# Patient Record
Sex: FEMALE | Race: BLACK OR AFRICAN AMERICAN | NOT HISPANIC OR LATINO | Employment: OTHER | URBAN - METROPOLITAN AREA
[De-identification: names, ages, dates, MRNs, and addresses within clinical notes are randomized per-mention and may not be internally consistent; named-entity substitution may affect disease eponyms.]

---

## 2017-01-10 ENCOUNTER — OFFICE VISIT (OUTPATIENT)
Dept: OBSTETRICS AND GYNECOLOGY | Facility: CLINIC | Age: 67
End: 2017-01-10
Payer: MEDICARE

## 2017-01-10 ENCOUNTER — HOSPITAL ENCOUNTER (OUTPATIENT)
Dept: RADIOLOGY | Facility: HOSPITAL | Age: 67
Discharge: HOME | End: 2017-01-10
Attending: ADVANCED PRACTICE MIDWIFE
Payer: MEDICARE

## 2017-01-10 ENCOUNTER — OFFICE VISIT (OUTPATIENT)
Dept: INTERNAL MEDICINE | Facility: CLINIC | Age: 67
End: 2017-01-10
Payer: MEDICARE

## 2017-01-10 VITALS
DIASTOLIC BLOOD PRESSURE: 70 MMHG | BODY MASS INDEX: 23.29 KG/M2 | WEIGHT: 136.44 LBS | SYSTOLIC BLOOD PRESSURE: 110 MMHG | HEIGHT: 64 IN

## 2017-01-10 VITALS
BODY MASS INDEX: 23.05 KG/M2 | SYSTOLIC BLOOD PRESSURE: 128 MMHG | DIASTOLIC BLOOD PRESSURE: 70 MMHG | TEMPERATURE: 97 F | WEIGHT: 134.25 LBS

## 2017-01-10 DIAGNOSIS — E78.5 HYPERLIPIDEMIA, UNSPECIFIED HYPERLIPIDEMIA TYPE: ICD-10-CM

## 2017-01-10 DIAGNOSIS — C90.01 MULTIPLE MYELOMA IN REMISSION: ICD-10-CM

## 2017-01-10 DIAGNOSIS — Z12.31 ENCOUNTER FOR SCREENING MAMMOGRAM FOR BREAST CANCER: ICD-10-CM

## 2017-01-10 DIAGNOSIS — E55.9 VITAMIN D INSUFFICIENCY: ICD-10-CM

## 2017-01-10 DIAGNOSIS — R53.83 FATIGUE, UNSPECIFIED TYPE: ICD-10-CM

## 2017-01-10 DIAGNOSIS — D63.8 ANEMIA OF CHRONIC DISORDER: Primary | ICD-10-CM

## 2017-01-10 DIAGNOSIS — Z01.419 ENCOUNTER FOR GYNECOLOGICAL EXAMINATION WITHOUT ABNORMAL FINDING: Primary | ICD-10-CM

## 2017-01-10 PROCEDURE — G0101 CA SCREEN;PELVIC/BREAST EXAM: HCPCS | Mod: S$PBB,,, | Performed by: NURSE PRACTITIONER

## 2017-01-10 PROCEDURE — 99999 PR PBB SHADOW E&M-EST. PATIENT-LVL III: CPT | Mod: PBBFAC,,, | Performed by: FAMILY MEDICINE

## 2017-01-10 PROCEDURE — 99999 PR PBB SHADOW E&M-EST. PATIENT-LVL III: CPT | Mod: PBBFAC,,, | Performed by: NURSE PRACTITIONER

## 2017-01-10 PROCEDURE — 77067 SCR MAMMO BI INCL CAD: CPT | Mod: 26,,, | Performed by: RADIOLOGY

## 2017-01-10 PROCEDURE — 99203 OFFICE O/P NEW LOW 30 MIN: CPT | Mod: S$PBB,,, | Performed by: FAMILY MEDICINE

## 2017-01-10 PROCEDURE — 88175 CYTOPATH C/V AUTO FLUID REDO: CPT

## 2017-01-10 PROCEDURE — 77067 SCR MAMMO BI INCL CAD: CPT | Mod: TC

## 2017-01-10 RX ORDER — ERGOCALCIFEROL 1.25 MG/1
50000 CAPSULE ORAL
COMMUNITY
End: 2018-03-22

## 2017-01-10 RX ORDER — METHADONE HYDROCHLORIDE 5 MG/1
2.5 TABLET ORAL
COMMUNITY
Start: 2016-11-22 | End: 2018-06-14

## 2017-01-10 RX ORDER — PANTOPRAZOLE SODIUM 40 MG/1
TABLET, DELAYED RELEASE ORAL
Refills: 2 | COMMUNITY
Start: 2016-12-21

## 2017-01-10 RX ORDER — LENALIDOMIDE 10 MG/1
1 CAPSULE ORAL DAILY
Refills: 0 | COMMUNITY
Start: 2016-12-30 | End: 2019-01-28

## 2017-01-10 RX ORDER — DEXAMETHASONE 4 MG/1
40 TABLET ORAL
COMMUNITY

## 2017-01-10 RX ORDER — ASPIRIN 81 MG/1
81 TABLET ORAL
COMMUNITY
Start: 2016-08-27 | End: 2018-07-23 | Stop reason: SDUPTHER

## 2017-01-10 RX ORDER — VALACYCLOVIR HCL 500 MG
500 TABLET ORAL
COMMUNITY
Start: 2016-06-17

## 2017-01-10 NOTE — PROGRESS NOTES
"CC: Well woman exam    Rhonda Jaramillo is a 67 y.o. female  presents for well woman exam.  LMP: No LMP recorded. Patient is postmenopausal..  No issues, problems, or complaints.      Past Medical History   Diagnosis Date    Bone cancer     Hyperlipidemia      Past Surgical History   Procedure Laterality Date    Arm surgery      Appendectomy       Social History     Social History    Marital status: Single     Spouse name: N/A    Number of children: N/A    Years of education: N/A     Occupational History    Not on file.     Social History Main Topics    Smoking status: Never Smoker    Smokeless tobacco: Not on file    Alcohol use No    Drug use: Not on file    Sexual activity: No     Other Topics Concern    Not on file     Social History Narrative     History reviewed. No pertinent family history.  OB History      Para Term  AB TAB SAB Ectopic Multiple Living    4    1  1   3          Visit Vitals    /70    Ht 5' 4" (1.626 m)    Wt 61.9 kg (136 lb 7.4 oz)    BMI 23.42 kg/m2         ROS:  GENERAL: Denies weight gain or weight loss. Feeling well overall.   SKIN: Denies rash or lesions.   HEAD: Denies head injury or headache.   NODES: Denies enlarged lymph nodes.   CHEST: Denies chest pain or shortness of breath.   CARDIOVASCULAR: Denies palpitations or left sided chest pain.   ABDOMEN: No abdominal pain, constipation, diarrhea, nausea, vomiting or rectal bleeding.   URINARY: No frequency, dysuria, hematuria, or burning on urination.  REPRODUCTIVE: See HPI.   BREASTS: The patient performs breast self-examination and denies pain, lumps, or nipple discharge.   HEMATOLOGIC: No easy bruisability or excessive bleeding.   MUSCULOSKELETAL: Denies joint pain or swelling.   NEUROLOGIC: Denies syncope or weakness.   PSYCHIATRIC: Denies depression, anxiety or mood swings.    PHYSICAL EXAM:  APPEARANCE: Well nourished, well developed, in no acute distress.  AFFECT: WNL, alert and " oriented x 3  SKIN: No acne or hirsutism  NECK: Neck symmetric without masses or thyromegaly  NODES: No inguinal, cervical, axillary, or femoral lymph node enlargement  CHEST: Good respiratory effect  ABDOMEN: Soft.  No tenderness or masses.  No hepatosplenomegaly.  No hernias.  BREASTS: Symmetrical, no skin changes or visible lesions.  No palpable masses, nipple discharge bilaterally.  PELVIC: Normal external genitalia without lesions.  Normal hair distribution.  Adequate perineal body, normal urethral meatus.  Vagina atrophic  without lesions or discharge.  Cervix atrophic pink, without lesions, discharge or tenderness.  No significant cystocele or rectocele.  Bimanual exam shows uterus to be normal size, regular, mobile and nontender.  Adnexa without masses or tenderness.    EXTREMITIES: No edema.  Physical Exam    1. Encounter for gynecological examination without abnormal finding  Liquid-based pap smear, screening   2. Encounter for screening mammogram for breast cancer  Mammo Digital Screening Bilat with CAD    AND PLAN:    Patient was counseled today on A.C.S. Pap guidelines and recommendations for yearly pelvic exams, mammograms and monthly self breast exams; to see her PCP for other health maintenance.

## 2017-01-10 NOTE — PROGRESS NOTES
Subjective:   Patient ID:  Rhonda Jaramillo is a 67 y.o. female.  Chief Complaint:  Annual Exam    Past Medical History   Diagnosis Date    Bone cancer     Hyperlipidemia      Past Surgical History   Procedure Laterality Date    Arm surgery      Appendectomy       History reviewed. No pertinent family history.  Review of patient's allergies indicates:   Allergen Reactions    Vicodin [hydrocodone-acetaminophen] Swelling    Celebrex [celecoxib] Swelling    Neurontin [gabapentin] Swelling    Nabumetone Other (See Comments)     Patient do not remember the adverse reaction to medication    Ibuprofen Rash     Current Outpatient Prescriptions   Medication Sig Dispense Refill    aspirin (ECOTRIN) 81 MG EC tablet Take 81 mg by mouth.      dexamethasone (DECADRON) 4 MG Tab Take 40 mg by mouth.      ergocalciferol (VITAMIN D2) 50,000 unit Cap Take 50,000 Units by mouth every 7 days.      methadone (DOLOPHINE) 5 MG tablet Take 2.5 mg by mouth.      pantoprazole (PROTONIX) 40 MG tablet TK 1 T PO D WITH BREAKFAST  2    REVLIMID 10 mg Cap Take 1 capsule by mouth once daily.   0    valacyclovir (VALTREX) 500 MG tablet Take 500 mg by mouth.       No current facility-administered medications for this visit.      HPI Comments: Presents to establish care with a PCP.  From [place], treated in [place] for multiple myeloma.  Recently relocated back to [place] and cared for by Dr. Ayala.  History of multiple myeloma with bone marrow transplant and chronic anemia.  Resultant chronic low back and bone pain.  Medications provided by Heme/Onc  History hyperlipidemia and vitamin D insufficiency.  Last labs stable in 2015.  Recent CBC and CMP stable.  H&H 11 and 33.  No diabetes.  Creatinine normal.  No recent colonoscopy.  Reports fecal occult blood tested and negative in [place].  Declines flu vaccine.  Saw GYN today.  Complains of intermittent and recurring left ear pain      Review of Systems   Constitutional:  Negative for chills, fatigue and fever.   HENT: Positive for ear pain. Negative for congestion, dental problem, hearing loss, postnasal drip, sinus pressure, sore throat, tinnitus, trouble swallowing and voice change.    Eyes: Negative for visual disturbance.   Respiratory: Negative for cough, chest tightness, shortness of breath and wheezing.    Cardiovascular: Negative for chest pain, palpitations and leg swelling.   Gastrointestinal: Negative for abdominal pain, blood in stool, constipation, diarrhea, nausea and vomiting.   Endocrine: Negative for polydipsia, polyphagia and polyuria.   Genitourinary: Negative for difficulty urinating, dysuria, flank pain, hematuria and pelvic pain.   Musculoskeletal: Positive for back pain and myalgias.   Skin: Negative for rash.   Neurological: Negative for dizziness, syncope, weakness, light-headedness and headaches.   Hematological: Negative for adenopathy. Bruises/bleeds easily.   Psychiatric/Behavioral: Negative.        Objective:     Visit Vitals    /70 (BP Location: Right arm, Patient Position: Sitting, BP Method: Manual)    Temp 96.9 °F (36.1 °C) (Tympanic)    Wt 60.9 kg (134 lb 4.2 oz)    BMI 23.05 kg/m2     Physical Exam   Constitutional: She is oriented to person, place, and time. Vital signs are normal. She appears cachectic.  Non-toxic appearance. She does not have a sickly appearance. She does not appear ill. No distress.   HENT:   Right Ear: Hearing, tympanic membrane, external ear and ear canal normal.   Left Ear: Hearing, tympanic membrane, external ear and ear canal normal.   Nose: Nose normal. Right sinus exhibits no maxillary sinus tenderness and no frontal sinus tenderness. Left sinus exhibits no maxillary sinus tenderness and no frontal sinus tenderness.   Mouth/Throat: Uvula is midline, oropharynx is clear and moist and mucous membranes are normal.   Eyes: Conjunctivae, EOM and lids are normal. Pupils are equal, round, and reactive to light.    Neck: No JVD present. No thyroid mass and no thyromegaly present.   Cardiovascular: Normal rate, regular rhythm and normal heart sounds.  Exam reveals no gallop and no friction rub.    No murmur heard.  Pulses:       Radial pulses are 2+ on the right side, and 2+ on the left side.   Pulmonary/Chest: Effort normal and breath sounds normal. She has no wheezes. She has no rhonchi. She has no rales.   Abdominal: Soft. Bowel sounds are normal. She exhibits no distension. There is no tenderness. There is no rebound, no guarding and no CVA tenderness.   Musculoskeletal: She exhibits no edema.   Neurological: She is oriented to person, place, and time. She displays a negative Romberg sign. Coordination and gait normal.   Skin: Skin is warm, dry and intact. No rash noted.   Psychiatric: She has a normal mood and affect. Her speech is normal and behavior is normal. Judgment and thought content normal. Cognition and memory are normal.   Nursing note and vitals reviewed.    Assessment:     1. Anemia of chronic disorder    2. Multiple myeloma in remission    3. Hyperlipidemia, unspecified hyperlipidemia type    4. Vitamin D insufficiency    5. Fatigue, unspecified type      Plan:   Anemia of chronic disorder/Multiple myeloma in remission  Continue medications, management, and follow-up with Dr. Ayala    Hyperlipidemia, unspecified hyperlipidemia type  -     Lipid panel; Future; Expected date: 1/10/17  Previous levels normal off medications.  [place] as indicated.    Vitamin D insufficiency  -     Vitamin D; Future; Expected date: 1/10/17  Adjust supplementation as needed.    Fatigue, unspecified type  -     TSH; Future; Expected date: 1/10/17  Treat as indicated.    Return to clinic 6 months or sooner as needed.

## 2017-01-10 NOTE — MR AVS SNAPSHOT
[facility]  [address] [place] [address]  Phone: [phone]  Fax: [phone]                  Rhonda Jaramillo   1/10/2017 11:00 AM   Office Visit    Description:  Female : 1950   Provider:  Raymond Mireles MD   Department:  [facility]           Reason for Visit     Annual Exam           Diagnoses this Visit        Comments    Anemia of chronic disorder    -  Primary     Multiple myeloma in remission         Hyperlipidemia, unspecified hyperlipidemia type         Vitamin D insufficiency         Fatigue, unspecified type                To Do List           Future Appointments        Provider Department Dept Phone    1/10/2017 12:45 PM [facility] [id number] [facility] [phone]    1/10/2017 2:00 PM LAB, SAME DAY [facility] [phone]      Goals (5 Years of Data)     None      Follow-Up and Disposition     Return in about 6 months (around 7/10/2017).      [facility]     [facility] Nurse Care Line -  Assistance  Registered nurses in the [facility] provide clinical advisement, health education, appointment booking, and other advisory services.  Call for this free service at [phone].             Medications                Verify that the below list of medications is an accurate representation of the medications you are currently taking.  If none reported, the list may be blank. If incorrect, please contact your healthcare provider. Carry this list with you in case of emergency.           Current Medications     aspirin (ECOTRIN) 81 MG EC tablet Take 81 mg by mouth.    dexamethasone (DECADRON) 4 MG Tab Take 40 mg by mouth.    methadone (DOLOPHINE) 5 MG tablet Take 2.5 mg by mouth.    pantoprazole (PROTONIX) 40 MG tablet TK 1 T PO D WITH BREAKFAST    REVLIMID 10 mg Cap Take 1 capsule by mouth once daily.     valacyclovir (VALTREX) 500 MG tablet Take 500 mg by mouth.           Clinical  Reference Information           Vital Signs - Last Recorded  Most recent update: 1/10/2017 11:22 AM by Ingrid Pina LPN    BP Temp Wt BMI       128/70 (BP Location: Right arm, Patient Position: Sitting, BP Method: Manual) 96.9 °F (36.1 °C) (Tympanic) 60.9 kg (134 lb 4.2 oz) 23.05 kg/m2       Blood Pressure          Most Recent Value    BP  128/70      Allergies as of 1/10/2017     Vicodin [Hydrocodone-acetaminophen]    Celebrex [Celecoxib]    Neurontin [Gabapentin]    Nabumetone    Ibuprofen      Immunizations Administered on Date of Encounter - 1/10/2017     None      Orders Placed During Today's Visit     Future Labs/Procedures Expected by Expires    Lipid panel  1/10/2017 4/10/2017    TSH  1/10/2017 3/11/2018    Vitamin D  1/10/2017 3/11/2018      [facility] Sign-Up     Activating your [facility] account is as easy as 1-2-3!     1) Visit [email or web address], select Sign Up Now, enter this activation code and your date of birth, then select Next.  [email or web address]  Expires: 2/24/2017 12:14 PM      2) Create a username and password to use when you visit [facility] in the future and select a security question in case you lose your password and select Next.    3) Enter your e-mail address and click Sign Up!    Additional Information  If you have questions, please e-mail [email or web address] or call [phone] to talk to our [facility] staff. Remember, [facility] is NOT to be used for urgent needs. For medical emergencies, dial 911.

## 2017-01-11 ENCOUNTER — TELEPHONE (OUTPATIENT)
Dept: INTERNAL MEDICINE | Facility: CLINIC | Age: 67
End: 2017-01-11

## 2017-01-11 NOTE — TELEPHONE ENCOUNTER
----- Message from Raymond Mireles MD sent at 1/11/2017  8:17 AM CST -----  Thyroid good.  Vitamin D normal.  Lipids acceptable.  No new or change in medications needed.  Continue all present medications.  Return in 6 months as planned.

## 2017-01-11 NOTE — TELEPHONE ENCOUNTER
Notified pt. Pt verbalized understanding and requested that copy of labs be mailed to home address. Copy of labs mailed.

## 2017-01-18 ENCOUNTER — TELEPHONE (OUTPATIENT)
Dept: INTERNAL MEDICINE | Facility: CLINIC | Age: 67
End: 2017-01-18

## 2017-01-18 NOTE — TELEPHONE ENCOUNTER
MD Ingrid Dawkins LPN        Caller: Unspecified (Today, 10:07 AM)                     She is able to participate in  activities/program

## 2017-01-18 NOTE — TELEPHONE ENCOUNTER
----- Message from Jayson Cooper sent at 1/18/2017  9:22 AM CST -----  Contact: pt  Pt is calling nurse staff regarding a request for papers to be filled out for patient center day care when the papers come through to staff. Pt call back [phone] to be advise thanks

## 2017-01-18 NOTE — TELEPHONE ENCOUNTER
S/w pt. Pt stated that a form will be faxed to our office that needs to be completed stating that pt is healthy and cleared to participate in day care center activities.

## 2017-01-23 ENCOUNTER — TELEPHONE (OUTPATIENT)
Dept: OBSTETRICS AND GYNECOLOGY | Facility: CLINIC | Age: 67
End: 2017-01-23

## 2017-01-24 NOTE — TELEPHONE ENCOUNTER
Received form via fax. Form needs to be completed and if pt needs TB skin test it can be done at facility, will need orders.

## 2017-01-24 NOTE — TELEPHONE ENCOUNTER
----- Message from Paulina Aviles sent at 1/24/2017  2:44 PM CST -----  Contact: Josefina Olivera-  w/ Adult care  States calling to check status of paperwork. Please adv/call [phone].//thanks. cw

## 2017-02-02 ENCOUNTER — TELEPHONE (OUTPATIENT)
Dept: INTERNAL MEDICINE | Facility: CLINIC | Age: 67
End: 2017-02-02

## 2017-02-02 NOTE — TELEPHONE ENCOUNTER
MD Ingrid Dawkins LPN        Caller: Unspecified (Today,  1:42 PM)                     Completed form.  Have paperwork for TB skin test and chest x-ray.   Will order in computer if going to get done here.

## 2017-02-02 NOTE — TELEPHONE ENCOUNTER
----- Message from Mary Beth Gomez sent at 2/2/2017 12:23 PM CST -----  Contact: Keith Jarod- Adult patient  Ms Parker  Is checking on status of a CMS ,TB and chest Xray, on 1/24/17 was told by nurse paperwork would be sent out to them for patient, but has not received anything, please fax [phone]. Thank you

## 2018-03-19 ENCOUNTER — OFFICE VISIT (OUTPATIENT)
Dept: INTERNAL MEDICINE | Facility: CLINIC | Age: 68
End: 2018-03-19
Payer: MEDICARE

## 2018-03-19 VITALS
TEMPERATURE: 97 F | SYSTOLIC BLOOD PRESSURE: 124 MMHG | BODY MASS INDEX: 29.99 KG/M2 | OXYGEN SATURATION: 98 % | WEIGHT: 175.69 LBS | HEIGHT: 64 IN | HEART RATE: 99 BPM | DIASTOLIC BLOOD PRESSURE: 80 MMHG

## 2018-03-19 DIAGNOSIS — R03.0 ELEVATED BLOOD PRESSURE READING WITHOUT DIAGNOSIS OF HYPERTENSION: Primary | ICD-10-CM

## 2018-03-19 DIAGNOSIS — Z94.84 STEM CELLS TRANSPLANT STATUS: ICD-10-CM

## 2018-03-19 DIAGNOSIS — C90.01 MULTIPLE MYELOMA IN REMISSION: ICD-10-CM

## 2018-03-19 DIAGNOSIS — Z12.39 SCREENING FOR BREAST CANCER: ICD-10-CM

## 2018-03-19 PROBLEM — E55.9 VITAMIN D INSUFFICIENCY: Chronic | Status: RESOLVED | Noted: 2017-01-10 | Resolved: 2018-03-19

## 2018-03-19 PROBLEM — D11.0 PLEOMORPHIC ADENOMA OF PAROTID GLAND: Status: ACTIVE | Noted: 2017-12-14

## 2018-03-19 PROCEDURE — 99214 OFFICE O/P EST MOD 30 MIN: CPT | Mod: S$PBB,,, | Performed by: FAMILY MEDICINE

## 2018-03-19 PROCEDURE — 99999 PR PBB SHADOW E&M-EST. PATIENT-LVL III: CPT | Mod: PBBFAC,,, | Performed by: FAMILY MEDICINE

## 2018-03-19 PROCEDURE — 99213 OFFICE O/P EST LOW 20 MIN: CPT | Mod: PBBFAC,PO | Performed by: FAMILY MEDICINE

## 2018-03-19 RX ORDER — IXAZOMIB 4 MG/1
CAPSULE ORAL DAILY
Status: ON HOLD | COMMUNITY
Start: 2018-03-02 | End: 2018-07-27 | Stop reason: HOSPADM

## 2018-03-19 RX ORDER — ACETAMINOPHEN 500 MG
TABLET ORAL
Qty: 1 EACH | Refills: 0 | Status: SHIPPED | OUTPATIENT
Start: 2018-03-19

## 2018-03-19 NOTE — PROGRESS NOTES
"Indicates that weight aching.  Medicare Subjective:   Patient ID: Rhonda Jaramillo is a 68 y.o. female.  Chief Complaint:  Follow-up (Blood pressure)      Patient presents for follow-up on elevated blood pressure.  Sees specialist at [facility].  Systolic blood pressure readings recently elevated 140-150 range.  Asymptomatic.  No medications.  All blood pressures at our facility within normal limits.  Patient denies previous diagnosis or treatment with any antihypertensive medications.  Overall doing well.  In remission.  Weight significantly improved.    Has appointment with GI to follow-up for rectal bleeding presumed internal hemorrhoids and to schedule colonoscopy.      Review of Systems   Constitutional: Negative for fatigue.   Eyes: Negative for visual disturbance.   Respiratory: Negative for cough, chest tightness, shortness of breath and wheezing.    Cardiovascular: Negative for chest pain, palpitations and leg swelling.   Gastrointestinal: Negative for abdominal pain, constipation, diarrhea, nausea and vomiting.   Genitourinary: Negative for difficulty urinating.   Musculoskeletal: Positive for back pain and myalgias.   Skin: Negative for rash.   Neurological: Negative for dizziness, syncope, weakness, light-headedness and headaches.   Hematological: Bruises/bleeds easily.   Psychiatric/Behavioral: Negative for sleep disturbance. The patient is not nervous/anxious.      Objective:   /80 (BP Location: Right arm, Patient Position: Sitting, BP Method: Small (Manual))   Pulse 99   Temp 97.2 °F (36.2 °C) (Tympanic)   Ht 5' 4" (1.626 m)   Wt 79.7 kg (175 lb 11.3 oz)   SpO2 98%   BMI 30.16 kg/m²     Physical Exam   Constitutional: Vital signs are normal. She appears well-developed and well-nourished.  Non-toxic appearance. She does not have a sickly appearance. She does not appear ill. No distress.   Significant weight gain since last visit.  Reports improved appetite.  Overall much more healthy appearing " today.  No longer cachectic.   Neck: No JVD present.   Cardiovascular: Normal rate, regular rhythm and normal heart sounds.  Exam reveals no gallop and no friction rub.    No murmur heard.  Pulmonary/Chest: Effort normal and breath sounds normal. She has no wheezes. She has no rhonchi. She has no rales.   Abdominal: Soft. She exhibits no distension. There is no tenderness.   Musculoskeletal: She exhibits no edema.   Skin: Skin is warm and dry. No rash noted.   Psychiatric: She has a normal mood and affect.   Nursing note and vitals reviewed.    Assessment:     1. Elevated blood pressure reading without diagnosis of hypertension    2. Multiple myeloma in remission    3. Stem cells transplant status    4. Screening for breast cancer      Plan:   Elevated blood pressure reading without diagnosis of hypertension  -     blood pressure monitor (BLOOD PRESSURE KIT) Kit; Use to check blood pressure 3 times a day  Dispense: 1 each; Refill: 0  BP normal in office.  Home blood pressure machine.  Arrange through home health.  Check 2-3 times daily.  Sent report of readings 2-4 weeks.  Start medication if average greater than 140/90.    Multiple myeloma in remission  Stem cells transplant status    RHM  -     Mammo Digital Screening Bilat with CAD; Future; Expected date: 03/19/2018  Colonoscopy per GI    Return to clinic 6 months or sooner as needed.

## 2018-03-22 ENCOUNTER — OFFICE VISIT (OUTPATIENT)
Dept: GASTROENTEROLOGY | Facility: CLINIC | Age: 68
End: 2018-03-22
Payer: MEDICARE

## 2018-03-22 VITALS
SYSTOLIC BLOOD PRESSURE: 124 MMHG | WEIGHT: 176.38 LBS | BODY MASS INDEX: 30.11 KG/M2 | DIASTOLIC BLOOD PRESSURE: 80 MMHG | HEIGHT: 64 IN | HEART RATE: 82 BPM

## 2018-03-22 DIAGNOSIS — Z12.11 SCREENING FOR MALIGNANT NEOPLASM OF COLON: Primary | ICD-10-CM

## 2018-03-22 DIAGNOSIS — K62.5 BRBPR (BRIGHT RED BLOOD PER RECTUM): ICD-10-CM

## 2018-03-22 PROCEDURE — 99204 OFFICE O/P NEW MOD 45 MIN: CPT | Mod: S$PBB,,, | Performed by: NURSE PRACTITIONER

## 2018-03-22 PROCEDURE — 99999 PR PBB SHADOW E&M-EST. PATIENT-LVL III: CPT | Mod: PBBFAC,,, | Performed by: NURSE PRACTITIONER

## 2018-03-22 PROCEDURE — 99213 OFFICE O/P EST LOW 20 MIN: CPT | Mod: PBBFAC,PO | Performed by: NURSE PRACTITIONER

## 2018-03-22 RX ORDER — SODIUM, POTASSIUM,MAG SULFATES 17.5-3.13G
1 SOLUTION, RECONSTITUTED, ORAL ORAL ONCE
Qty: 1 BOTTLE | Refills: 0 | Status: SHIPPED | OUTPATIENT
Start: 2018-03-22 | End: 2018-03-22

## 2018-03-22 NOTE — PROGRESS NOTES
Clinic Consult:  [facility]    Reason for Consult:  The primary encounter diagnosis was Screening for malignant neoplasm of colon. A diagnosis of BRBPR (bright red blood per rectum) was also pertinent to this visit.    PCP: Raymond Mireles       HPI:  This is a 68 y.o. female here for evaluation of the above  She reports that she had one episode of BRBPR a few weeks ago.  Blood was when wiping, not mixed with stool.   She has a hx of multiple myeloma which is in remission and being monitored at [facility].  No previous colonoscopy.   No fam hx of CRC or IBD  Pt has had no further episode of bleeding  Denies any abdominal pain.  No nausea or vomiting.  No change in bowel pattern.  No melena or hematochezia. No weight loss.      Review of Systems   Constitutional: Negative for chills, fever, malaise/fatigue and weight loss.   Respiratory: Negative for cough.    Cardiovascular: Negative for chest pain.   Gastrointestinal:        Per HPI   Musculoskeletal: Negative for myalgias.   Skin: Negative for itching and rash.   Neurological: Negative for headaches.   Psychiatric/Behavioral: The patient is not nervous/anxious.        Medical History:   Past Medical History:   Diagnosis Date    Bone cancer     Hyperlipidemia        Surgical History:  Past Surgical History:   Procedure Laterality Date    APPENDECTOMY      arm surgery         Family History:   No family history on file.    Social History:   Social History   Substance Use Topics    Smoking status: Never Smoker    Smokeless tobacco: Not on file    Alcohol use No       Allergies: Reviewed    Home Medications:   Current Outpatient Prescriptions on File Prior to Visit   Medication Sig Dispense Refill    blood pressure monitor (BLOOD PRESSURE KIT) Kit Use to check blood pressure 3 times a day 1 each 0    dexamethasone (DECADRON) 4 MG Tab Take 40 mg by mouth.      methadone (DOLOPHINE) 5 MG tablet Take 2.5 mg by mouth as needed.   "     NINLARO 4 mg Cap once daily.       pantoprazole (PROTONIX) 40 MG tablet TK 1 T PO D WITH BREAKFAST  2    REVLIMID 10 mg Cap Take 1 capsule by mouth once daily.   0    valacyclovir (VALTREX) 500 MG tablet Take 500 mg by mouth.      aspirin (ECOTRIN) 81 MG EC tablet Take 81 mg by mouth.      [DISCONTINUED] ergocalciferol (VITAMIN D2) 50,000 unit Cap Take 50,000 Units by mouth every 7 days.       No current facility-administered medications on file prior to visit.        Physical Exam:  Vital Signs:  /80   Pulse 82   Ht 5' 4" (1.626 m)   Wt 80 kg (176 lb 5.9 oz)   BMI 30.27 kg/m²   Body mass index is 30.27 kg/m².  Physical Exam   Constitutional: She is oriented to person, place, and time. She appears well-developed and well-nourished.   HENT:   Head: Normocephalic.   Eyes: No scleral icterus.   Neck: Normal range of motion.   Cardiovascular: Normal rate and regular rhythm.    Pulmonary/Chest: Effort normal and breath sounds normal.   Abdominal: Soft. Bowel sounds are normal. She exhibits no distension. There is no tenderness.   Musculoskeletal: Normal range of motion.   Neurological: She is alert and oriented to person, place, and time.   Skin: Skin is warm and dry.   Psychiatric: She has a normal mood and affect.   Vitals reviewed.      Labs: Pertinent labs reviewed.      Assessment:  1. Screening for malignant neoplasm of colon    2. BRBPR (bright red blood per rectum)         Recommendations:  - No further episodes of bleeding  - Will plan for screening colonoscopy with Suprep  Screening for malignant neoplasm of colon  -     Case request GI: COLONOSCOPY  -     sodium,potassium,mag sulfates (SUPREP BOWEL PREP KIT) 17.5-3.13-1.6 gram SolR; Take 1 Units by mouth once.  Dispense: 1 Bottle; Refill: 0    BRBPR (bright red blood per rectum)          Follow up to be determined by results of procedure.        Thank you so much for allowing me to participate in the care of Rhonda STOREY " REBECA Dash-C

## 2018-04-02 ENCOUNTER — TELEPHONE (OUTPATIENT)
Dept: INTERNAL MEDICINE | Facility: CLINIC | Age: 68
End: 2018-04-02

## 2018-04-02 NOTE — TELEPHONE ENCOUNTER
----- Message from Svitlana Rios sent at 4/2/2018 12:23 PM CDT -----  Contact: Pt  She is calling in regards to wanting to speak to the nurse concerning she wanted to know if the Dr still wanted her to monitor her blood pressure and have her caregiver send in the results.    Pt can be reached at .[phone] ([email or web address])

## 2018-04-06 ENCOUNTER — DOCUMENTATION ONLY (OUTPATIENT)
Dept: ENDOSCOPY | Facility: HOSPITAL | Age: 68
End: 2018-04-06

## 2018-04-06 ENCOUNTER — HOSPITAL ENCOUNTER (OUTPATIENT)
Dept: RADIOLOGY | Facility: HOSPITAL | Age: 68
Discharge: HOME | End: 2018-04-06
Attending: FAMILY MEDICINE
Payer: MEDICARE

## 2018-04-06 VITALS — HEIGHT: 64 IN | WEIGHT: 176 LBS | BODY MASS INDEX: 30.05 KG/M2

## 2018-04-06 DIAGNOSIS — Z12.39 SCREENING FOR BREAST CANCER: ICD-10-CM

## 2018-04-06 PROCEDURE — 77067 SCR MAMMO BI INCL CAD: CPT | Mod: TC,PO

## 2018-04-06 PROCEDURE — 77067 SCR MAMMO BI INCL CAD: CPT | Mod: 26,,, | Performed by: RADIOLOGY

## 2018-04-06 PROCEDURE — 77063 BREAST TOMOSYNTHESIS BI: CPT | Mod: 26,,, | Performed by: RADIOLOGY

## 2018-04-06 NOTE — PROGRESS NOTES
4.6.2018 pt requests to cancel scheduled procedure.  Will call back when ready to schedule.  Case removed from schedule.

## 2018-06-11 ENCOUNTER — DOCUMENTATION ONLY (OUTPATIENT)
Dept: ENDOSCOPY | Facility: HOSPITAL | Age: 68
End: 2018-06-11

## 2018-06-11 NOTE — PROGRESS NOTES
Incoming call. Pt scheduled appt.  Colonoscopy prep instructions mailed to pt address on file. Included were suprep instructions and Low fiber diet.

## 2018-06-13 ENCOUNTER — OFFICE VISIT (OUTPATIENT)
Dept: INTERNAL MEDICINE | Facility: CLINIC | Age: 68
End: 2018-06-13
Payer: MEDICARE

## 2018-06-13 VITALS
HEART RATE: 95 BPM | WEIGHT: 187.38 LBS | BODY MASS INDEX: 31.99 KG/M2 | OXYGEN SATURATION: 98 % | HEIGHT: 64 IN | DIASTOLIC BLOOD PRESSURE: 82 MMHG | TEMPERATURE: 97 F | SYSTOLIC BLOOD PRESSURE: 138 MMHG

## 2018-06-13 DIAGNOSIS — B37.2 CANDIDIASIS, INTERTRIGO: Primary | ICD-10-CM

## 2018-06-13 PROCEDURE — 99213 OFFICE O/P EST LOW 20 MIN: CPT | Mod: PBBFAC,PO | Performed by: FAMILY MEDICINE

## 2018-06-13 PROCEDURE — 99214 OFFICE O/P EST MOD 30 MIN: CPT | Mod: S$PBB,,, | Performed by: FAMILY MEDICINE

## 2018-06-13 PROCEDURE — 99999 PR PBB SHADOW E&M-EST. PATIENT-LVL III: CPT | Mod: PBBFAC,,, | Performed by: FAMILY MEDICINE

## 2018-06-13 RX ORDER — PROCHLORPERAZINE MALEATE 5 MG
5 TABLET ORAL EVERY 6 HOURS PRN
COMMUNITY
End: 2018-07-23

## 2018-06-13 RX ORDER — CICLOPIROX OLAMINE 7.7 MG/G
CREAM TOPICAL 2 TIMES DAILY
Qty: 30 G | Refills: 1 | Status: SHIPPED | OUTPATIENT
Start: 2018-06-13 | End: 2018-07-23 | Stop reason: SDUPTHER

## 2018-06-13 NOTE — PROGRESS NOTES
"Subjective:   Patient ID: Rhonda Jaramillo is a 68 y.o. female.  Chief Complaint:  Rash (Under breast)    Rash   This is a recurrent problem. The current episode started 1 to 4 weeks ago. The problem has been gradually worsening since onset. The affected locations include the torso (Underneath bilateral breasts right greater than left). The rash is characterized by dryness, redness, scaling and itchiness. She was exposed to nothing. Pertinent negatives include no anorexia, congestion, cough, diarrhea, eye pain, facial edema, fatigue, fever, joint pain, nail changes, rhinorrhea, shortness of breath, sore throat or vomiting. Past treatments include topical steroids (Gold Bond powder). The treatment provided mild relief.     Review of Systems   Constitutional: Negative for chills, fatigue and fever.   HENT: Negative for congestion, postnasal drip, rhinorrhea, sneezing and sore throat.    Eyes: Negative for pain.   Respiratory: Negative for cough, chest tightness, shortness of breath and wheezing.    Cardiovascular: Negative for chest pain and leg swelling.   Gastrointestinal: Negative for abdominal pain, anorexia, diarrhea, nausea and vomiting.   Endocrine: Negative for polydipsia, polyphagia and polyuria.   Genitourinary: Negative for difficulty urinating.   Musculoskeletal: Negative for joint pain and myalgias.   Skin: Positive for rash. Negative for nail changes.     Objective:   /82 (BP Location: Right arm, Patient Position: Sitting, BP Method: Large (Manual))   Pulse 95   Temp 97 °F (36.1 °C) (Tympanic)   Ht 5' 4" (1.626 m)   Wt 85 kg (187 lb 6.3 oz)   SpO2 98%   BMI 32.17 kg/m²     Physical Exam   Constitutional: Vital signs are normal. She appears well-developed and well-nourished. No distress.   HENT:   Nose: Nose normal. No mucosal edema or rhinorrhea.   Mouth/Throat: Oropharynx is clear and moist and mucous membranes are normal.   Eyes: Right conjunctiva is not injected. Left conjunctiva is not " injected.   Cardiovascular: Normal rate and regular rhythm.    Pulmonary/Chest: Effort normal and breath sounds normal. No respiratory distress. She has no wheezes. She has no rhonchi. She has no rales. Right breast exhibits skin change. Right breast exhibits no inverted nipple, no mass, no nipple discharge and no tenderness. Left breast exhibits skin change. Left breast exhibits no inverted nipple, no mass, no nipple discharge and no tenderness. Breasts are symmetrical. There is no breast swelling.   Red raised circular scaly/flaky rash underneath bilateral breast and skin fold area.  Skin fold areas involved.  No secondary infection.  No true satellite lesions.  Right greater than left.   Abdominal: Soft. She exhibits no distension. There is no tenderness.   Genitourinary: No breast tenderness, discharge or bleeding.   Skin: Skin is warm, dry and intact. Rash noted.   Psychiatric: She has a normal mood and affect.   Nursing note and vitals reviewed.    Assessment:     1. Candidiasis, intertrigo      Plan:   Candidiasis, intertrigo  -     ciclopirox (LOPROX) 0.77 % Crea; Apply topically 2 (two) times daily.  Dispense: 30 g; Refill: 1    Candida versus other dermatophyte intertrigo.  Topical ciclopirox twice a day to cover for both etiologies.  May continue gold Bond powder topically to prevent moisture.  Should significantly improve in 5-7 days, should completely resolve in 2-4 weeks.  Return to clinic as needed.

## 2018-06-14 ENCOUNTER — TELEPHONE (OUTPATIENT)
Dept: INTERNAL MEDICINE | Facility: CLINIC | Age: 68
End: 2018-06-14

## 2018-06-14 NOTE — TELEPHONE ENCOUNTER
Patient called to inform provider she no longer takes vitamin D3 and methadone.  Patient informed medication list will be updated.

## 2018-06-14 NOTE — TELEPHONE ENCOUNTER
----- Message from Cristobal Smith sent at 6/14/2018 11:04 AM CDT -----  Contact: Pt   Pt requested a callback will elaborate during callback..[phone] (home)

## 2018-07-23 ENCOUNTER — OFFICE VISIT (OUTPATIENT)
Dept: INTERNAL MEDICINE | Facility: CLINIC | Age: 68
End: 2018-07-23
Payer: MEDICARE

## 2018-07-23 VITALS
OXYGEN SATURATION: 98 % | HEIGHT: 64 IN | WEIGHT: 185.63 LBS | DIASTOLIC BLOOD PRESSURE: 72 MMHG | HEART RATE: 98 BPM | SYSTOLIC BLOOD PRESSURE: 122 MMHG | TEMPERATURE: 97 F | BODY MASS INDEX: 31.69 KG/M2

## 2018-07-23 DIAGNOSIS — I99.8 FLUCTUATING BLOOD PRESSURE: Primary | ICD-10-CM

## 2018-07-23 DIAGNOSIS — E78.5 HYPERLIPIDEMIA, UNSPECIFIED HYPERLIPIDEMIA TYPE: Chronic | ICD-10-CM

## 2018-07-23 DIAGNOSIS — B37.2 CANDIDIASIS, INTERTRIGO: ICD-10-CM

## 2018-07-23 PROCEDURE — 99214 OFFICE O/P EST MOD 30 MIN: CPT | Mod: S$PBB,,, | Performed by: FAMILY MEDICINE

## 2018-07-23 PROCEDURE — 99213 OFFICE O/P EST LOW 20 MIN: CPT | Mod: PBBFAC,PO | Performed by: FAMILY MEDICINE

## 2018-07-23 PROCEDURE — 99999 PR PBB SHADOW E&M-EST. PATIENT-LVL III: CPT | Mod: PBBFAC,,, | Performed by: FAMILY MEDICINE

## 2018-07-23 RX ORDER — OLANZAPINE 5 MG/1
5 TABLET ORAL
COMMUNITY
Start: 2018-07-10 | End: 2019-03-07

## 2018-07-23 RX ORDER — CHLORHEXIDINE GLUCONATE ORAL RINSE 1.2 MG/ML
15 SOLUTION DENTAL
COMMUNITY
Start: 2018-07-10

## 2018-07-23 RX ORDER — ASPIRIN 81 MG/1
81 TABLET ORAL DAILY
Qty: 90 TABLET | Refills: 3 | COMMUNITY
Start: 2018-07-23

## 2018-07-23 RX ORDER — NYSTATIN 100000 [USP'U]/G
POWDER TOPICAL 4 TIMES DAILY
Qty: 60 G | Refills: 0 | Status: SHIPPED | OUTPATIENT
Start: 2018-07-23

## 2018-07-23 RX ORDER — CICLOPIROX OLAMINE 7.7 MG/G
CREAM TOPICAL 2 TIMES DAILY
Qty: 90 G | Refills: 0 | Status: SHIPPED | OUTPATIENT
Start: 2018-07-23

## 2018-07-23 NOTE — PROGRESS NOTES
"Subjective:   Patient ID: Rhonda Jaramillo is a 68 y.o. female.  Chief Complaint:  Medication Refill and Orders (Cardiology referral)      Patient presents for follow-up on bilateral rash under breast.    Prescribed topical cream.  States significantly improved.  Approximately 6 weeks use.  Has not completely resolved.  Needs refill.    Also with fluctuating blood pressures at home.  Saw specialist for cancer follow-up in [place].  Advised to schedule appointment with Cardiology to have "heart" looked at.  Requesting referral to [facility].  No active symptoms chest pain, shortness of breath, or swelling.        Review of Systems   Constitutional: Negative for fatigue.   Eyes: Negative for visual disturbance.   Respiratory: Negative for cough, chest tightness, shortness of breath and wheezing.    Cardiovascular: Negative for chest pain, palpitations and leg swelling.   Gastrointestinal: Negative for abdominal pain, constipation, diarrhea, nausea and vomiting.   Genitourinary: Negative for difficulty urinating.   Musculoskeletal: Negative for myalgias.   Skin: Positive for rash.   Neurological: Negative for dizziness, syncope, weakness, light-headedness and headaches.   Psychiatric/Behavioral: Negative for sleep disturbance. The patient is not nervous/anxious.      Objective:   /72 (BP Location: Right arm, Patient Position: Sitting, BP Method: Large (Manual))   Pulse 98   Temp 97.1 °F (36.2 °C) (Tympanic)   Ht 5' 4" (1.626 m)   Wt 84.2 kg (185 lb 10 oz)   SpO2 98%   BMI 31.86 kg/m²     Physical Exam   Constitutional: Vital signs are normal. She appears well-developed and well-nourished. No distress.   HENT:   Nose: Nose normal. No mucosal edema or rhinorrhea.   Mouth/Throat: Oropharynx is clear and moist and mucous membranes are normal.   Eyes: Right conjunctiva is not injected. Left conjunctiva is not injected.   Cardiovascular: Normal rate and regular rhythm.    Pulmonary/Chest: Effort normal " and breath sounds normal. No respiratory distress. She has no wheezes. She has no rhonchi. She has no rales. Right breast exhibits skin change. Right breast exhibits no inverted nipple, no mass, no nipple discharge and no tenderness. Left breast exhibits skin change. Left breast exhibits no inverted nipple, no mass, no nipple discharge and no tenderness. Breasts are symmetrical. There is no breast swelling.   Red raised circular scaly/flaky rash  Now only located underneath breast on chest wall.    No longer with lesions on breast.  Skin fold areas no longer involved  No secondary infection.  No true satellite lesions.   Genitourinary: No breast tenderness, discharge or bleeding.   Skin: Skin is warm, dry and intact. Rash noted.   Psychiatric: She has a normal mood and affect.   Nursing note and vitals reviewed.    Assessment:     1. Fluctuating blood pressure    2. Candidiasis, intertrigo    3. Hyperlipidemia, unspecified hyperlipidemia type      Plan:   Fluctuating blood pressure  -     Ambulatory referral to Cardiology  Referral to Cardiology.  Patient advised to bring ambulatory blood pressure monitor to appointment.      Candidiasis, intertrigo  -     ciclopirox (LOPROX) 0.77 % Crea; Apply topically 2 (two) times daily.  Dispense: 90 g; Refill: 0  -     nystatin (MYCOSTATIN) powder; Apply topically 4 (four) times daily.  Dispense: 60 g; Refill: 0  Refill of Loprox.  Discussed preventative measures to help cool/dry area underneath breast.    Add Mycostatin topical powder.    If no complete resolution in the next 2-4 weeks, call for Dermatology referral.      Hyperlipidemia, unspecified hyperlipidemia type  -     aspirin (ECOTRIN) 81 MG EC tablet; Take 1 tablet (81 mg total) by mouth once daily.  Dispense: 90 tablet; Refill: 3    Keep all specialist appointment is scheduled.  Return to clinic as needed

## 2018-07-27 ENCOUNTER — ANESTHESIA EVENT (OUTPATIENT)
Dept: ENDOSCOPY | Facility: HOSPITAL | Age: 68
End: 2018-07-27
Payer: MEDICARE

## 2018-07-27 ENCOUNTER — HOSPITAL ENCOUNTER (OUTPATIENT)
Facility: HOSPITAL | Age: 68
Discharge: HOME | End: 2018-07-27
Attending: FAMILY MEDICINE | Admitting: FAMILY MEDICINE
Payer: MEDICARE

## 2018-07-27 ENCOUNTER — SURGERY (OUTPATIENT)
Age: 68
End: 2018-07-27

## 2018-07-27 ENCOUNTER — ANESTHESIA (OUTPATIENT)
Dept: ENDOSCOPY | Facility: HOSPITAL | Age: 68
End: 2018-07-27
Payer: MEDICARE

## 2018-07-27 DIAGNOSIS — Z83.719 FAMILY HISTORY OF COLONIC POLYPS: ICD-10-CM

## 2018-07-27 DIAGNOSIS — Z12.11 SPECIAL SCREENING FOR MALIGNANT NEOPLASMS, COLON: ICD-10-CM

## 2018-07-27 DIAGNOSIS — K63.5 POLYP OF COLON, UNSPECIFIED PART OF COLON, UNSPECIFIED TYPE: ICD-10-CM

## 2018-07-27 DIAGNOSIS — Z12.11 COLON CANCER SCREENING: Primary | ICD-10-CM

## 2018-07-27 DIAGNOSIS — K57.30 DIVERTICULOSIS OF COLON: ICD-10-CM

## 2018-07-27 PROCEDURE — 88305 TISSUE EXAM BY PATHOLOGIST: CPT | Performed by: PATHOLOGY

## 2018-07-27 PROCEDURE — 27201089 HC SNARE, DISP (ANY): Performed by: FAMILY MEDICINE

## 2018-07-27 PROCEDURE — 37000009 HC ANESTHESIA EA ADD 15 MINS: Performed by: FAMILY MEDICINE

## 2018-07-27 PROCEDURE — 37000008 HC ANESTHESIA 1ST 15 MINUTES: Performed by: FAMILY MEDICINE

## 2018-07-27 PROCEDURE — 25000003 PHARM REV CODE 250: Performed by: NURSE ANESTHETIST, CERTIFIED REGISTERED

## 2018-07-27 PROCEDURE — 88305 TISSUE EXAM BY PATHOLOGIST: CPT | Mod: 26,,, | Performed by: PATHOLOGY

## 2018-07-27 PROCEDURE — 45385 COLONOSCOPY W/LESION REMOVAL: CPT | Performed by: FAMILY MEDICINE

## 2018-07-27 PROCEDURE — 25000003 PHARM REV CODE 250: Performed by: FAMILY MEDICINE

## 2018-07-27 PROCEDURE — 63600175 PHARM REV CODE 636 W HCPCS: Performed by: NURSE ANESTHETIST, CERTIFIED REGISTERED

## 2018-07-27 PROCEDURE — 45385 COLONOSCOPY W/LESION REMOVAL: CPT | Mod: PT,,, | Performed by: FAMILY MEDICINE

## 2018-07-27 RX ORDER — PROPOFOL 10 MG/ML
VIAL (ML) INTRAVENOUS
Status: DISCONTINUED | OUTPATIENT
Start: 2018-07-27 | End: 2018-07-27

## 2018-07-27 RX ORDER — SODIUM CHLORIDE, SODIUM LACTATE, POTASSIUM CHLORIDE, CALCIUM CHLORIDE 600; 310; 30; 20 MG/100ML; MG/100ML; MG/100ML; MG/100ML
INJECTION, SOLUTION INTRAVENOUS CONTINUOUS PRN
Status: DISCONTINUED | OUTPATIENT
Start: 2018-07-27 | End: 2018-07-27

## 2018-07-27 RX ORDER — LIDOCAINE HYDROCHLORIDE 10 MG/ML
INJECTION INFILTRATION; PERINEURAL
Status: DISCONTINUED | OUTPATIENT
Start: 2018-07-27 | End: 2018-07-27

## 2018-07-27 RX ORDER — SODIUM CHLORIDE, SODIUM LACTATE, POTASSIUM CHLORIDE, CALCIUM CHLORIDE 600; 310; 30; 20 MG/100ML; MG/100ML; MG/100ML; MG/100ML
INJECTION, SOLUTION INTRAVENOUS CONTINUOUS
Status: DISCONTINUED | OUTPATIENT
Start: 2018-07-27 | End: 2018-07-27 | Stop reason: HOSPADM

## 2018-07-27 RX ORDER — SODIUM CHLORIDE 0.9 % (FLUSH) 0.9 %
3 SYRINGE (ML) INJECTION
Status: CANCELLED | OUTPATIENT
Start: 2018-07-27

## 2018-07-27 RX ADMIN — PROPOFOL 60 MG: 10 INJECTION, EMULSION INTRAVENOUS at 02:07

## 2018-07-27 RX ADMIN — SODIUM CHLORIDE, SODIUM LACTATE, POTASSIUM CHLORIDE, AND CALCIUM CHLORIDE: .6; .31; .03; .02 INJECTION, SOLUTION INTRAVENOUS at 02:07

## 2018-07-27 RX ADMIN — PROPOFOL 30 MG: 10 INJECTION, EMULSION INTRAVENOUS at 02:07

## 2018-07-27 RX ADMIN — LIDOCAINE HYDROCHLORIDE 40 MG: 10 INJECTION, SOLUTION INFILTRATION; PERINEURAL at 02:07

## 2018-07-27 RX ADMIN — PROPOFOL 40 MG: 10 INJECTION, EMULSION INTRAVENOUS at 02:07

## 2018-07-27 RX ADMIN — SODIUM CHLORIDE, SODIUM LACTATE, POTASSIUM CHLORIDE, AND CALCIUM CHLORIDE: .6; .31; .03; .02 INJECTION, SOLUTION INTRAVENOUS at 01:07

## 2018-07-27 NOTE — DISCHARGE INSTRUCTIONS
Diverticulosis    Diverticulosis means that small pouches have formed in the wall of your large intestine (colon). Most often, this problem causes no symptoms and is common as people age. But the pouches in the colon are at risk of becoming infected. When this happens, the condition is called diverticulitis. Although most people with diverticulosis never develop diverticulitis, it is still not uncommon. Rectal bleeding can also occur and in less common situations, a type of colon inflammation called colitis.  While most people do not have symptoms, some people with diverticulosis may have:  · Abdominal cramps and pain  · Bloating  · Constipation  · Change in bowel habits  Causes  The exact cause of diverticulosis (and diverticulitis) has not been proved, but a few things are associated with the condition:  · Low-fiber diet  · Constipation  · Lack of exercise  Your healthcare provider will talk with you about how to manage your condition. Diet changes may be all that are needed to help control diverticulosis and prevent progression to diverticulitis. If you develop diverticulitis, you will likely need other treatments.  Home care  You may be told to take fiber supplements daily. Fiber adds bulk to the stool so that it passes through the colon more easily. Stool softeners may be recommended. You may also be given medications for pain relief. Be sure to take all medications as directed.  In the past, people were told to avoid corn, nuts, and seeds. This is no longer necessary.  Follow these guidelines when caring for yourself at home:  · Eat unprocessed foods that are high in fiber. Whole grains, fruits, and vegetables are good choices.  · Drink 6 to 8 glasses of water every day unless your healthcare provider has you limit how much fluid you should have.  · Watch for changes in your bowel movements. Tell your provider if you notice any changes.  · Begin an exercise program. Ask your provider how to get started.  Generally, walking is the best.  · Get plenty of rest and sleep.  Follow-up care  Follow up with your healthcare provider, or as advised. Regular visits may be needed to check on your health. Sometimes special procedures such as colonoscopy, are needed after an episode of diverticulitis or blooding. Be sure to keep all your appointments.  If a stool sample was taken, or cultures were done, you should be told if they are positive, or if your treatment needs to be changed. You can call as directed for the results.  If X-rays were done, a radiologist will look at them. You will be told if there is a change in your treatment.  If antibiotics were prescribed, be sure to finish them all.  When to seek medical advice  Call your healthcare provider right away if any of these occur:  · Fever of 100.4°F (38°C) or higher, or as directed by your healthcare provider  · Severe cramps in the lower left side of the abdomen or pain that is getting worse  · Tenderness in the lower left side of the abdomen or worsening pain throughout the abdomen  · Diarrhea or constipation that doesn't get better within 24 hours  · Nausea and vomiting  · Bleeding from the rectum  Call 911  Call emergency services if any of the following occur:  · Trouble breathing  · Confusion  · Very drowsy or trouble awakening  · Fainting or loss of consciousness  · Rapid heart rate  · Chest pain  Date Last Reviewed: 12/30/2015 © 2000-2017 [employer]. [address] [place] [address]. All rights reserved. This information is not intended as a substitute for professional medical care. Always follow your healthcare professional's instructions.        Understanding Colon and Rectal Polyps    The colon (also called the large intestine) is a muscular tube that forms the last part of the digestive tract. It absorbs water and stores food waste. The colon is about 4 to 6 feet long. The rectum is the last 6 inches of the colon. The colon and rectum  have a smooth lining composed of millions of cells. Changes in these cells can lead to growths in the colon that can become cancerous and should be removed. Multiple tests are available to screen for colon cancer, but the colonoscopy is the most recommended test. During colonoscopy, these polyps can be removed. How often you need this test depends on many things including your condition, your family history, symptoms, and what the findings were at the previous colonoscopy.   When the colon lining changes  Changes that happen in the cells that line the colon or rectum can lead to growths called polyps. Over a period of years, polyps can turn cancerous. Removing polyps early may prevent cancer from ever forming.  Polyps  Polyps are fleshy clumps of tissue that form on the lining of the colon or rectum. Small polyps are usually benign (not cancerous). However, over time, cells in a polyp can change and become cancerous. Certain types of polyps known as adenomatous polyps are premalignant. The risk for invasive cancer increases with the size of the polyp and certain cell and gene features. This means that they can become cancerous if they're not removed. Hyperplastic polyps are benign. They can grow quite large and not turn cancerous.   Cancer  Almost all colorectal cancers start when polyp cells begin growing abnormally. As a cancerous tumor grows, it may involve more and more of the colon or rectum. In time, cancer can also grow beyond the colon or rectum and spread to nearby organs or to glands called lymph nodes. The cells can also travel to other parts of the body. This is known as metastasis. The earlier a cancerous tumor is removed, the better the chance of preventing its spread.    Date Last Reviewed: 8/1/2016  © 9225-2932 [employer]. [address] [place] [address], [place] [address]. All rights reserved. This information is not intended as a substitute for professional medical care. Always follow your  healthcare professional's instructions.

## 2018-07-27 NOTE — [employer] PATIENT INSTRUCTIONS
Discharge Summary/Instructions after an Endoscopic Procedure  Patient Name: Rhonda Jaramillo  Patient MRN: [id number]  Patient YOB: 1950 Friday, July 27, 2018 Victor M Alvarez MD  RESTRICTIONS:  During your procedure today, you received medications for sedation.  These   medications may affect your judgment, balance and coordination.  Therefore,   for 24 hours, you have the following restrictions:   - DO NOT drive a car, operate machinery, make legal/financial decisions,   sign important papers or drink alcohol.    ACTIVITY:  Today: no heavy lifting, straining or running due to procedural   sedation/anesthesia.  The following day: return to full activity including work.  DIET:  Eat and drink normally unless instructed otherwise.     TREATMENT FOR COMMON SIDE EFFECTS:  - Mild abdominal pain, nausea, belching, bloating or excessive gas:  rest,   eat lightly and use a heating pad.  - Sore Throat: treat with throat lozenges and/or gargle with warm salt   water.  - Because air was used during the procedure, expelling large amounts of air   from your rectum or belching is normal.  - If a bowel prep was taken, you may not have a bowel movement for 1-3 days.    This is normal.  SYMPTOMS TO WATCH FOR AND REPORT TO YOUR PHYSICIAN:  1. Abdominal pain or bloating, other than gas cramps.  2. Chest pain.  3. Back pain.  4. Signs of infection such as: chills or fever occurring within 24 hours   after the procedure.  5. Rectal bleeding, which would show as bright red, maroon, or black stools.   (A tablespoon of blood from the rectum is not serious, especially if   hemorrhoids are present.)  6. Vomiting.  7. Weakness or dizziness.  GO DIRECTLY TO THE NEAREST EMERGENCY ROOM IF YOU HAVE ANY OF THE FOLLOWING:      Difficulty breathing              Chills and/or fever over 101 F   Persistent vomiting and/or vomiting blood   Severe abdominal pain   Severe chest pain   Black, tarry stools   Bleeding- more than one tablespoon   Any  other symptom or condition that you feel may need urgent attention  Your doctor recommends these additional instructions:  If any biopsies were taken, your doctors clinic will contact you in 1 to 2   weeks with any results.  - Patient has a contact number available for emergencies.  The signs and   symptoms of potential delayed complications were discussed with the   patient.  Return to normal activities tomorrow.  Written discharge   instructions were provided to the patient.   - Resume previous diet.   - Continue present medications.   - Await pathology results.   - Repeat colonoscopy in 5 years for surveillance.   - Telephone my office for pathology results in 1 week.   - Discharge patient to home (via wheelchair).  For questions, problems or results please call your physician Victor M Alvarez MD at Work:  [phone]  If you have any questions about the above instructions, call the GI   department at [phone] or call the endoscopy unit at [phone]   from 7am until 3 pm.  [facility] [place], EMERGENCY ROOM PHONE NUMBER:   [phone]  IF A COMPLICATION OR EMERGENCY SITUATION ARISES AND YOU ARE UNABLE TO REACH   YOUR PHYSICIAN - GO DIRECTLY TO THE EMERGENCY ROOM.  I have read or have had read to me these discharge instructions for my   procedure and have received a written copy.  I understand these   instructions and will follow-up with my physician if I have any questions.     __________________________________       _____________________________________  Nurse Signature                                          Patient/Designated   Responsible Party Signature  Victor M Alvarez MD  7/27/2018 3:07:31 PM  This report has been verified and signed electronically.  [employer]

## 2018-07-27 NOTE — TRANSFER OF CARE
"Anesthesia Transfer of Care Note    Patient: Rhonda Jaramillo    Procedure(s) Performed: Procedure(s) (LRB):  COLONOSCOPY (N/A)    Patient location: GI    Anesthesia Type: MAC    Transport from OR: Transported from OR on room air with adequate spontaneous ventilation    Post pain: adequate analgesia    Post assessment: no apparent anesthetic complications    Post vital signs: stable    Level of consciousness: awake    Complications: none    Transfer of care protocol was followed      Last vitals:   Visit Vitals  /79 (BP Location: Left arm, Patient Position: Lying)   Pulse 76   Temp 36.7 °C (98.1 °F) (Oral)   Resp 18   Ht 5' 4" (1.626 m)   Wt 83.9 kg (185 lb)   SpO2 97%   Breastfeeding? No   BMI 31.76 kg/m²     "

## 2018-07-27 NOTE — ANESTHESIA POSTPROCEDURE EVALUATION
"Anesthesia Post Evaluation    Patient: Rhonda Jaramillo    Procedure(s) Performed: Procedure(s) (LRB):  COLONOSCOPY (N/A)    Final Anesthesia Type: MAC  Patient location during evaluation: GI PACU  Patient participation: Yes- Able to Participate  Level of consciousness: awake and alert  Post-procedure vital signs: reviewed and stable  Pain management: adequate  Airway patency: patent  PONV status at discharge: No PONV  Anesthetic complications: no      Cardiovascular status: blood pressure returned to baseline  Respiratory status: unassisted  Hydration status: euvolemic  Follow-up not needed.        Visit Vitals  /79 (BP Location: Left arm, Patient Position: Lying)   Pulse 76   Temp 36.7 °C (98.1 °F) (Oral)   Resp 18   Ht 5' 4" (1.626 m)   Wt 83.9 kg (185 lb)   SpO2 97%   Breastfeeding? No   BMI 31.76 kg/m²       Pain/George Score: Pain Assessment Performed: Yes (7/27/2018  1:22 PM)  Presence of Pain: denies (7/27/2018  1:22 PM)      "

## 2018-07-27 NOTE — ANESTHESIA PREPROCEDURE EVALUATION
07/27/2018  Rhonda Jaramillo is a 68 y.o., female.    Anesthesia Evaluation    I have reviewed the Patient Summary Reports.     I have reviewed the Medications.   Decadron    Review of Systems  Anesthesia Hx:  No problems with previous Anesthesia  History of prior surgery of interest to airway management or planning: jaw. Previous anesthesia: General Denies Family Hx of Anesthesia complications.   Denies Personal Hx of Anesthesia complications.   Social:  Non-Smoker    Hematology/Oncology:         -- Anemia: Current/Recent Cancer. right chemotherapy and radiation Oncology Comments: Radiated right jaw. Currently in remission     EENT/Dental:EENT/Dental Normal   Cardiovascular:   BP has been fluctuating with chemo and meds but denies any chest pain or sob. Will see cardiology for evaluation of BP   Pulmonary:  Pulmonary Normal    Renal/:  Renal/ Normal     Hepatic/GI:   Bowel Prep.    Musculoskeletal:   Multiple myeloma   Neurological:  Neurology Normal    Endocrine:  Endocrine Normal    Psych:  Psychiatric Normal           Physical Exam  General:  Well nourished    Airway/Jaw/Neck:  Airway Findings: Mouth Opening: Normal Tongue: Normal  General Airway Assessment: Adult  Mallampati: I  Improves to I with phonation.  TM Distance: Normal, at least 6 cm  Jaw/Neck Findings:  Neck ROM: Normal ROM   Radiation to right jaw for cancer    Dental:  Dental Findings: Edentulous        Mental Status:  Mental Status Findings:  Cooperative, Alert and Oriented         Anesthesia Plan  Type of Anesthesia, risks & benefits discussed:  Anesthesia Type:  MAC  Patient's Preference:   Intra-op Monitoring Plan:   Intra-op Monitoring Plan Comments:   Post Op Pain Control Plan:   Post Op Pain Control Plan Comments:   Induction:   IV  Beta Blocker:  Patient is not currently on a Beta-Blocker (No further documentation required).        Informed Consent: Patient understands risks and agrees with Anesthesia plan.  Questions answered. Anesthesia consent signed with patient.  ASA Score: 2     Day of Surgery Review of History & Physical: I have interviewed and examined the patient. I have reviewed the patient's H&P dated: 7/27/18. There are no significant changes.  H&P update referred to the provider.         Ready For Surgery From Anesthesia Perspective.

## 2018-07-27 NOTE — H&P
Short Stay Endoscopy History and Physical    PCP - Raymond Mireles MD    Procedure - Colonoscopy  ASA - 2  Mallampati - per anesthesia  History of Anesthesia problems - no  Family history Anesthesia problems -  no     HPI:  This is a 68 y.o. female here for evaluation of :   Active Hospital Problems    Diagnosis  POA    *Colon cancer screening [Z12.11]  Not Applicable    Special screening for malignant neoplasms, colon [Z12.11]  Not Applicable    Family history of colonic polyps [Z83.71]  Not Applicable     Her mother and aunt have had colon polyps.        Resolved Hospital Problems    Diagnosis Date Resolved POA   No resolved problems to display.         Health Maintenance       Date Due Completion Date    Hepatitis C Screening 1950 ---    Colonoscopy 01/07/1999 ---    DEXA SCAN 08/06/2017 8/6/2014 (Done)    Override on 8/6/2014: Done (PER CARE EVERYWHERE)    Influenza Vaccine 08/01/2018 12/12/2017    Override on 12/12/2017: Done    Mammogram 04/06/2019 4/6/2018    Override on 6/19/2015: Done (PER CARE EVERYWHERE)    Pneumococcal (65+) (2 of 2 - PPSV23) 07/10/2019 7/10/2018    Lipid Panel 01/10/2022 1/10/2017    Override on 5/2/2015: Done (PER CARE EVERYWHERE)    TETANUS VACCINE 03/13/2028 3/13/2018 (Done)    Override on 3/13/2018: Done          Screening - yes  History of polyps - no  Diarrhea - no  Anemia - no  Blood in stools - no  Abdominal pain - no  Other - no    ROS:  CONSTITUTIONAL: Denies weight change,  fatigue, fevers, chills, night sweats.  CARDIOVASCULAR: Denies chest pain, shortness of breath, orthopnea and edema.  RESPIRATORY: Denies cough, hemoptysis, dyspnea, and wheezing.  GI: See HPI.    Medical History:   Past Medical History:   Diagnosis Date    Bone cancer     Family history of colonic polyps 7/27/2018    Her mother and aunt have had colon polyps.    Hyperlipidemia        Surgical History:   Past Surgical History:   Procedure Laterality Date    APPENDECTOMY      arm surgery          Family History:   Family History   Problem Relation Age of Onset    Ovarian cancer Maternal Aunt     Breast cancer Maternal Grandmother     Breast cancer Maternal Cousin        Social History:   Social History   Substance Use Topics    Smoking status: Never Smoker    Smokeless tobacco: Not on file    Alcohol use No       Allergies:   Review of patient's allergies indicates:   Allergen Reactions    Cholecalciferol (vitamin d3) Swelling     Pt reported swelling of her gums.    Vicodin [hydrocodone-acetaminophen] Swelling    Celebrex [celecoxib] Swelling    Neurontin [gabapentin] Swelling    Nabumetone Other (See Comments)     Patient do not remember the adverse reaction to medication    Ibuprofen Rash       Medications:   No current facility-administered medications on file prior to encounter.      Current Outpatient Prescriptions on File Prior to Encounter   Medication Sig Dispense Refill    blood pressure monitor (BLOOD PRESSURE KIT) Kit Use to check blood pressure 3 times a day 1 each 0    pantoprazole (PROTONIX) 40 MG tablet TK 1 T PO D WITH BREAKFAST  2    dexamethasone (DECADRON) 4 MG Tab Take 40 mg by mouth.      NINLARO 4 mg Cap once daily.       REVLIMID 10 mg Cap Take 1 capsule by mouth once daily.   0    valACYclovir (VALTREX) 500 MG tablet Take 500 mg by mouth.         Physical Exam:  Vital Signs:   Vitals:    07/27/18 1325   BP: 125/79   Pulse: 76   Resp: 18   Temp: 98.1 °F (36.7 °C)     General Appearance: Well appearing in no acute distress  ENT: OP clear  Chest: CTA B  CV: RRR, no m/r/g  Abd: s/nt/nd/nabs  Ext: no edema    Labs:Reviewed    IMP:  Active Hospital Problems    Diagnosis  POA    *Colon cancer screening [Z12.11]  Not Applicable    Special screening for malignant neoplasms, colon [Z12.11]  Not Applicable    Family history of colonic polyps [Z83.71]  Not Applicable     Her mother and aunt have had colon polyps.        Resolved Hospital Problems    Diagnosis Date  Resolved POA   No resolved problems to display.         Plan:   I have explained the risks and benefits of colonoscopy to the patient including but not limited to bleeding, perforation, infection, and death. The patient wishes to proceed.

## 2018-07-27 NOTE — DISCHARGE SUMMARY
Endoscopy Discharge Summary      Admit Date: 7/27/2018    Discharge Date and Time:  7/27/2018 3:09 PM    Attending Physician: Victor M Alvarez MD     Discharge Physician: Victor M Alvarez MD     Principal Admitting Diagnoses: Colon cancer screening         Discharge Diagnosis: The primary encounter diagnosis was Colon cancer screening. Diagnoses of Special screening for malignant neoplasms, colon, Family history of colonic polyps, Polyp of colon, unspecified part of colon, unspecified type, and Diverticulosis of colon were also pertinent to this visit.     Discharged Condition: Good    Indication for Admission: Colon cancer screening     Hospital Course: Patient was admitted for an inpatient procedure and tolerated the procedure well with no complications.    Significant Diagnostic Studies: Colonoscopy with cold snare polypectomy and Colonoscopy with hot snare polypectomy    Pathology (if any):  Specimen (12h ago through future)    Start     Ordered    07/27/18 1502  Specimen to Pathology - Surgery  Once     Comments:  1. Ascending Colon Polyps      07/27/18 1504          Estimated Blood Loss: 1 ml.    Discussed with: patient and family.    Disposition: Home.    Follow Up/Patient Instructions:   Current Discharge Medication List      CONTINUE these medications which have NOT CHANGED    Details   aspirin (ECOTRIN) 81 MG EC tablet Take 1 tablet (81 mg total) by mouth once daily.  Qty: 90 tablet, Refills: 3    Associated Diagnoses: Hyperlipidemia, unspecified hyperlipidemia type      blood pressure monitor (BLOOD PRESSURE KIT) Kit Use to check blood pressure 3 times a day  Qty: 1 each, Refills: 0    Comments: Okay dispense patient preferred/insurance preferred kit  Associated Diagnoses: Elevated blood pressure reading without diagnosis of hypertension      chlorhexidine (PERIDEX) 0.12 % solution 15 mLs.      ciclopirox (LOPROX) 0.77 % Crea Apply topically 2 (two) times daily.  Qty: 90 g, Refills: 0    Associated  Diagnoses: Candidiasis, intertrigo      nystatin (MYCOSTATIN) powder Apply topically 4 (four) times daily.  Qty: 60 g, Refills: 0    Associated Diagnoses: Candidiasis, intertrigo      OLANZapine (ZYPREXA) 5 MG tablet Take 5 mg by mouth.      pantoprazole (PROTONIX) 40 MG tablet TK 1 T PO D WITH BREAKFAST  Refills: 2      dexamethasone (DECADRON) 4 MG Tab Take 40 mg by mouth.      REVLIMID 10 mg Cap Take 1 capsule by mouth once daily.   Refills: 0      valACYclovir (VALTREX) 500 MG tablet Take 500 mg by mouth.         STOP taking these medications       NINLARO 4 mg Cap Comments:   Reason for Stopping:                 Discharge Procedure Orders  Diet general     Call MD for:  temperature >100.4     Call MD for:  persistent nausea and vomiting     Call MD for:  severe uncontrolled pain     Call MD for:  difficulty breathing, headache or visual disturbances     Call MD for:  redness, tenderness, or signs of infection (pain, swelling, redness, odor or green/yellow discharge around incision site)     Call MD for:  hives     Call MD for:  persistent dizziness or light-headedness     No dressing needed         Follow-up Information     Victor M Alvarez MD. Call in 1 week.    Specialty:  Family Medicine  Why:  To receive pathology results.  Contact information:  [address] [facility] [address] [phone]                   [email or web address]

## 2018-07-30 VITALS
DIASTOLIC BLOOD PRESSURE: 76 MMHG | HEIGHT: 64 IN | WEIGHT: 185 LBS | SYSTOLIC BLOOD PRESSURE: 130 MMHG | RESPIRATION RATE: 18 BRPM | TEMPERATURE: 98 F | BODY MASS INDEX: 31.58 KG/M2 | OXYGEN SATURATION: 98 % | HEART RATE: 72 BPM

## 2018-08-02 NOTE — PROGRESS NOTES
Dear Raymond Mireles MD,    I recently cared for Rhonda Jaramillo and performed an endoscopy.  Tissue was sent for pathology evaluation and I will have a letter written to ask let her know that due to age and the pathology, that I don't recommend that she have further colonoscopy screening.  The pathology showed that there was only inflammatory tissue.  Thank you for allowing me to participate in the care of your patient.  Please call me for any questions that you might have.      Dr. Victor M Alvarez  [phone] [facility]  [phone] office      NURSING STAFF:Please  inform the patient that I reviewed the recent pathology obtained at the time of colonoscopy.    The results showed that there was inflammatory tissue present which is benign and based on that, I recommend that the patient does not need future endoscopy for screening.     If the patient has MyChart, this message has been sent to them.  Confirm that they read the note.  If not, copy the information and print a letter to send to the patient at this time.  Confirm that a notation to the PCP was done.      Dear Rhonda Jaramillo,    This is to inform you that I have reviewed your recent colonoscopy pathology.  The results showed that you had inflammatory tissue present which is benign and based on that and your age, I do not feel that you need further colonoscopy screening in the future.     Dr. Victor M Alvarez  [phone]

## 2018-08-07 ENCOUNTER — OFFICE VISIT (OUTPATIENT)
Dept: CARDIOLOGY | Facility: CLINIC | Age: 68
End: 2018-08-07
Payer: MEDICARE

## 2018-08-07 ENCOUNTER — TELEPHONE (OUTPATIENT)
Dept: CARDIOLOGY | Facility: CLINIC | Age: 68
End: 2018-08-07

## 2018-08-07 VITALS
HEIGHT: 64 IN | WEIGHT: 183 LBS | BODY MASS INDEX: 31.24 KG/M2 | HEART RATE: 88 BPM | SYSTOLIC BLOOD PRESSURE: 110 MMHG | DIASTOLIC BLOOD PRESSURE: 68 MMHG

## 2018-08-07 DIAGNOSIS — Z01.818 ENCOUNTER FOR OTHER PREPROCEDURAL EXAMINATION: Primary | ICD-10-CM

## 2018-08-07 DIAGNOSIS — E78.5 HYPERLIPIDEMIA, UNSPECIFIED HYPERLIPIDEMIA TYPE: Chronic | ICD-10-CM

## 2018-08-07 DIAGNOSIS — C90.01 MULTIPLE MYELOMA IN REMISSION: ICD-10-CM

## 2018-08-07 DIAGNOSIS — I51.7 LVH (LEFT VENTRICULAR HYPERTROPHY): ICD-10-CM

## 2018-08-07 PROCEDURE — 99213 OFFICE O/P EST LOW 20 MIN: CPT | Mod: PBBFAC,PO | Performed by: INTERNAL MEDICINE

## 2018-08-07 PROCEDURE — 99999 PR PBB SHADOW E&M-EST. PATIENT-LVL III: CPT | Mod: PBBFAC,,, | Performed by: INTERNAL MEDICINE

## 2018-08-07 PROCEDURE — 93005 ELECTROCARDIOGRAM TRACING: CPT | Mod: PBBFAC,PO | Performed by: INTERNAL MEDICINE

## 2018-08-07 PROCEDURE — 99204 OFFICE O/P NEW MOD 45 MIN: CPT | Mod: S$PBB,,, | Performed by: INTERNAL MEDICINE

## 2018-08-07 PROCEDURE — 93010 ELECTROCARDIOGRAM REPORT: CPT | Mod: S$PBB,,, | Performed by: INTERNAL MEDICINE

## 2018-08-07 NOTE — PROGRESS NOTES
Subjective:   Patient ID:  Rhonda Jaramillo is a 68 y.o. female who presents for evaluation of Establish Care      69 yo female, referred for pre chemo evaluation.  PMH multiple myeloma, in 2015, XRT/chemo in 2016, stem cell Tx in 2017 at [facility].   BP is fragile. BP checked at home < 140/90 mmHG  Will start IV Rx this week. (daratumumab, Revlimide and Dex) for 8 weeks first.  Off Ninlaro due to fragile BP by Dr. Xiong.  Denied h/o heart attack, DM, CVA and no smoking.  Walks daily.  Mother had HTN, father had kidney failure.              Past Medical History:   Diagnosis Date    Bone cancer     Family history of colonic polyps 7/27/2018    Her mother and aunt have had colon polyps.    Hyperlipidemia        Past Surgical History:   Procedure Laterality Date    APPENDECTOMY      arm surgery      COLONOSCOPY N/A 7/27/2018    Procedure: COLONOSCOPY;  Surgeon: Victor M Alvarez MD;  Location: [facility];  Service: Endoscopy;  Laterality: N/A;       Social History   Substance Use Topics    Smoking status: Never Smoker    Smokeless tobacco: Not on file    Alcohol use No       Family History   Problem Relation Age of Onset    Ovarian cancer Maternal Aunt     Breast cancer Maternal Grandmother     Breast cancer Maternal Cousin        Review of Systems   Constitution: Negative for decreased appetite, diaphoresis, fever, weakness, malaise/fatigue and night sweats.   HENT: Negative for nosebleeds.    Eyes: Negative for blurred vision and double vision.   Cardiovascular: Negative for chest pain, claudication, dyspnea on exertion, irregular heartbeat, leg swelling, near-syncope, orthopnea, palpitations, paroxysmal nocturnal dyspnea and syncope.   Respiratory: Negative for cough, shortness of breath, sleep disturbances due to breathing, snoring, sputum production and wheezing.    Endocrine: Negative for cold intolerance and polyuria.   Hematologic/Lymphatic: Does not bruise/bleed easily.   Skin: Negative for  rash.   Musculoskeletal: Negative for back pain, falls, joint pain, joint swelling and neck pain.   Gastrointestinal: Negative for abdominal pain, heartburn, nausea and vomiting.   Genitourinary: Negative for dysuria, frequency and hematuria.   Neurological: Negative for difficulty with concentration, dizziness, focal weakness, headaches, light-headedness, numbness and seizures.   Psychiatric/Behavioral: Negative for depression, memory loss and substance abuse. The patient does not have insomnia.    Allergic/Immunologic: Negative for HIV exposure and hives.       Objective:   Physical Exam   Constitutional: She is oriented to person, place, and time. She appears well-nourished.   HENT:   Head: Normocephalic.   Eyes: Pupils are equal, round, and reactive to light.   Neck: Normal carotid pulses and no JVD present. Carotid bruit is not present. No thyromegaly present.   Cardiovascular: Normal rate, regular rhythm, normal heart sounds and normal pulses.   No extrasystoles are present. PMI is not displaced.  Exam reveals no gallop and no S3.    No murmur heard.  Pulmonary/Chest: Breath sounds normal. No stridor. No respiratory distress.   Abdominal: Soft. Bowel sounds are normal. There is no tenderness. There is no rebound.   Musculoskeletal: Normal range of motion.   Neurological: She is alert and oriented to person, place, and time.   Skin: Skin is intact. No rash noted.   Psychiatric: Her behavior is normal.       Lab Results   Component Value Date    CHOL 214 (H) 01/10/2017     Lab Results   Component Value Date    HDL 72 01/10/2017     Lab Results   Component Value Date    LDLCALC 112.8 01/10/2017     Lab Results   Component Value Date    TRIG 146 01/10/2017     Lab Results   Component Value Date    CHOLHDL 33.6 01/10/2017       Chemistry    No results found for: NA, K, CL, CO2, BUN, CREATININE, GLU No results found for: CALCIUM, ALKPHOS, AST, ALT, BILITOT, ESTGFRAFRICA, EGFRNONAA       No results found for: LABA1C,  HGBA1C  Lab Results   Component Value Date    TSH 0.643 01/10/2017     No results found for: INR, PROTIME  No results found for: WBC, HGB, HCT, MCV, PLT  BNP  @LABRCNTIP(BNP,BNPTRIAGEBLO)@  CrCl cannot be calculated (No order found.).  No results found in the last 24 hours.  No results found in the last 24 hours.  No results found in the last 24 hours.    Assessment:      1. Encounter for other preChemo examination    2. Multiple myeloma in remission    3. Hyperlipidemia, unspecified hyperlipidemia type    4. LVH (left ventricular hypertrophy)      BP controlled    Plan:   Echo with HARLEY and 4 D EF ordered.  DASH  Check lipid profile  Check RO at home  RTC in 6 months

## 2018-08-07 NOTE — TELEPHONE ENCOUNTER
This is non-face-to-face cardiac evaluation of chemo-therapy patient before the full cardiology consultation. Consultation: 35 minutes.   The information in EPIC was reviewed.    She followed with Ney Washington at [facility] oncology service on 07/25/2018 as following:  Ms. Jaramillo is a 68-year-old lady with IgG kappa multiple myeloma status post high-dose chemotherapy followed by autologous stem cell transplant at [facility] in June 2017.     She was seen at [facility] on October 17, 2017 at which time serologic evaluation revealed increasing IgG level from 660 to 1180. It was recommended then she be initiated on maintenance therapy consisting of a 3 drug maintenance regimen of ixazomib, lenalidomide, dexamethasone extrapolating from data from [place]ooka et al which demonstrated encouraging progression free survival with 3 drug maintenance regimen with VRd in the posttransplant setting and high risk myeloma patients. She was initiated on maintenance therapy consisting of ixazomib, lenalidomide, dexamethasone in mid November 2017.    She returns to clinic today for her scheduled follow-up appointment. She has recently been to [facility] where the recommendation for switching therapy to lenalidomide, dexamethasone, and daratumumab was made. She continues to feel well and is tolerating treatment well.    Per LexiComp Drug information:  Cardiovascular adverse reactions:  ---Daratumumab:  Hypertension (10%)  ---Revlimid: Peripheral edema (16% to 20%); hypotension (7%), hypertension (6%), chest pain (5%), palpitations (5%), deep vein thrombosis (2% to 4%), pulmonary embolism (1% to 2%), cardiac failure;          Frequency not defined:  Cardiovascular: Angina pectoris, atrial fibrillation (including exacerbation), bradycardia, cardiac disease (aortic disorder), cardiogenic shock, cardiomyopathy, cerebral infarction, cerebrovascular accident, ischemia, ischemic heart disease, myocardial infarction,  septic shock, subarachnoid hemorrhage, superficial thrombophlebitis, supraventricular cardiac arrhythmia, supraventricular tachycardia, tachyarrhythmia, thrombosis, transient ischemic attacks, ventricular dysfunction    Cardiac plan:  1. Advise cardiology evaluation, including a history and physical examination at cardiology clinic.  2. Full echo with HARLEY and 4D EF; consider to repeat limited echo with HARLEY in 3 months  3. Check Lipid profile. Monitoring BP and BS at home and PCP visits.  4. Troponin and BNP level if indicated after echo.  5. DASH diet

## 2018-08-07 NOTE — LETTER
August 7, 2018      Raymond Mireles MD  [address] [place] [address]           [facility]  [address] [place] [address]  Phone: [phone]  Fax: [phone]          Patient: Rhonda Jaramillo   MR Number: [id number]   YOB: 1950   Date of Visit: 8/7/2018       Dear Dr. Raymond Mireles:    Thank you for referring Rhonda Jaramillo to me for evaluation. Attached you will find relevant portions of my assessment and plan of care.    If you have questions, please do not hesitate to call me. I look forward to following Rhonda Jaramillo along with you.    Sincerely,    Juan Vega MD    Enclosure  CC:  No Recipients    If you would like to receive this communication electronically, please contact [email or web address] or [phone] to request more information on [employer] access.    For providers and/or their staff who would like to refer a patient to [facility], please contact us through our one-stop-shop provider referral line, [facility] , at [phone].    If you feel you have received this communication in error or would no longer like to receive these types of communications, please e-mail [email or web address]          Declined

## 2018-08-08 ENCOUNTER — OFFICE VISIT (OUTPATIENT)
Dept: CARDIOLOGY | Facility: CLINIC | Age: 68
End: 2018-08-08
Payer: MEDICARE

## 2018-08-08 DIAGNOSIS — Z01.818 ENCOUNTER FOR OTHER PREPROCEDURAL EXAMINATION: Primary | ICD-10-CM

## 2018-08-08 PROCEDURE — 99211 OFF/OP EST MAY X REQ PHY/QHP: CPT | Mod: PBBFAC,PO | Performed by: INTERNAL MEDICINE

## 2018-08-08 PROCEDURE — 99358 PROLONG SERVICE W/O CONTACT: CPT | Mod: S$PBB,,, | Performed by: INTERNAL MEDICINE

## 2018-08-08 PROCEDURE — 99999 PR PBB SHADOW E&M-EST. PATIENT-LVL I: CPT | Mod: PBBFAC,,, | Performed by: INTERNAL MEDICINE

## 2018-08-08 NOTE — PROGRESS NOTES
Subjective:   Patient ID:  Rhonda Jaramillo is a 68 y.o. female who presents for evaluation of No chief complaint on file.      This is non-face-to-face cardiac evaluation of pre chemo-therapy patient before the full cardiology consultation. Consultation: 35 minutes.   The information in EPIC was reviewed.     She followed with Ney Washington at [facility] oncology service on 07/25/2018 as following:  Ms. Jaramillo is a 68-year-old lady with IgG kappa multiple myeloma status post high-dose chemotherapy followed by autologous stem cell transplant at [facility] [place] in June 2017.     She was seen at [facility] on October 17, 2017 at which time serologic evaluation revealed increasing IgG level from 660 to 1180. It was recommended then she be initiated on maintenance therapy consisting of a 3 drug maintenance regimen of ixazomib, lenalidomide, dexamethasone extrapolating from data from [place]ence et al which demonstrated encouraging progression free survival with 3 drug maintenance regimen with VRd in the posttransplant setting and high risk myeloma patients. She was initiated on maintenance therapy consisting of ixazomib, lenalidomide, dexamethasone in mid November 2017.    She returns to clinic today for her scheduled follow-up appointment. She has recently been to [facility] where the recommendation for switching therapy to lenalidomide, dexamethasone, and daratumumab was made. She continues to feel well and is tolerating treatment well.     Per LexiComp Drug information:  Cardiovascular adverse reactions:  ---Daratumumab:  Hypertension (10%)  ---Revlimid: Peripheral edema (16% to 20%); hypotension (7%), hypertension (6%), chest pain (5%), palpitations (5%), deep vein thrombosis (2% to 4%), pulmonary embolism (1% to 2%), cardiac failure;          Frequency not defined:  Cardiovascular: Angina pectoris, atrial fibrillation (including exacerbation), bradycardia, cardiac disease (aortic disorder),  cardiogenic shock, cardiomyopathy, cerebral infarction, cerebrovascular accident, ischemia, ischemic heart disease, myocardial infarction, septic shock, subarachnoid hemorrhage, superficial thrombophlebitis, supraventricular cardiac arrhythmia, supraventricular tachycardia, tachyarrhythmia, thrombosis, transient ischemic attacks, ventricular dysfunction     Cardiac plan:  1. Advise cardiology evaluation, including a history and physical examination at cardiology clinic.  2. Full echo with HARLEY and 4D EF; consider to repeat limited echo with HARLEY in 3 months  3. Check Lipid profile. Monitoring BP and BS at home and PCP visits.  4. Troponin and BNP level if indicated after echo.  5. DASH diet              Past Medical History:   Diagnosis Date    Bone cancer     Family history of colonic polyps 7/27/2018    Her mother and aunt have had colon polyps.    Hyperlipidemia        Past Surgical History:   Procedure Laterality Date    APPENDECTOMY      arm surgery      COLONOSCOPY N/A 7/27/2018    Procedure: COLONOSCOPY;  Surgeon: Victor M Alvarez MD;  Location: [facility];  Service: Endoscopy;  Laterality: N/A;       Social History   Substance Use Topics    Smoking status: Never Smoker    Smokeless tobacco: Not on file    Alcohol use No       Family History   Problem Relation Age of Onset    Ovarian cancer Maternal Aunt     Breast cancer Maternal Grandmother     Breast cancer Maternal Cousin        ROS    Objective:   Physical Exam    Lab Results   Component Value Date    CHOL 214 (H) 01/10/2017     Lab Results   Component Value Date    HDL 72 01/10/2017     Lab Results   Component Value Date    LDLCALC 112.8 01/10/2017     Lab Results   Component Value Date    TRIG 146 01/10/2017     Lab Results   Component Value Date    CHOLHDL 33.6 01/10/2017       Chemistry    No results found for: NA, K, CL, CO2, BUN, CREATININE, GLU No results found for: CALCIUM, ALKPHOS, AST, ALT, BILITOT, ESTGFRAFRICA, EGFRNONAA       No  results found for: LABA1C, HGBA1C  Lab Results   Component Value Date    TSH 0.643 01/10/2017     No results found for: INR, PROTIME  No results found for: WBC, HGB, HCT, MCV, PLT  BNP  @LABRCNTIP(BNP,BNPTRIAGEBLO)@  CrCl cannot be calculated (No order found.).  No results found in the last 24 hours.  No results found in the last 24 hours.  No results found in the last 24 hours.    Assessment:      1. Encounter for other preChemo examination        Plan:   Encounter for other preChemo examination

## 2018-08-15 ENCOUNTER — CLINICAL SUPPORT (OUTPATIENT)
Dept: CARDIOLOGY | Facility: CLINIC | Age: 68
End: 2018-08-15
Attending: INTERNAL MEDICINE
Payer: MEDICARE

## 2018-08-15 DIAGNOSIS — E78.5 HYPERLIPIDEMIA, UNSPECIFIED HYPERLIPIDEMIA TYPE: Chronic | ICD-10-CM

## 2018-08-15 DIAGNOSIS — C90.01 MULTIPLE MYELOMA IN REMISSION: ICD-10-CM

## 2018-08-15 LAB
DIASTOLIC DYSFUNCTION: YES
ESTIMATED PA SYSTOLIC PRESSURE: 27.46
RETIRED EF AND QEF - SEE NOTES: 60 (ref 55–65)

## 2018-08-15 PROCEDURE — 93306 TTE W/DOPPLER COMPLETE: CPT | Mod: PO

## 2018-08-15 PROCEDURE — 93306 TTE W/DOPPLER COMPLETE: CPT | Mod: 26,,, | Performed by: INTERNAL MEDICINE

## 2018-08-17 ENCOUNTER — TELEPHONE (OUTPATIENT)
Dept: CARDIOLOGY | Facility: CLINIC | Age: 68
End: 2018-08-17

## 2018-08-17 DIAGNOSIS — C90.01 MULTIPLE MYELOMA IN REMISSION: Primary | ICD-10-CM

## 2018-10-19 ENCOUNTER — TELEPHONE (OUTPATIENT)
Dept: CARDIOLOGY | Facility: CLINIC | Age: 68
End: 2018-10-19

## 2018-10-19 NOTE — TELEPHONE ENCOUNTER
Sent message to Lynn and r/s    ----- Message from Lucie Guerrero sent at 10/19/2018  9:16 AM CDT -----  Contact: self [phone]  Pt called to request reschedule of echo; note on chart indicates not to reschedule. Please call back at [phone].  Md Hanh

## 2018-10-23 ENCOUNTER — TELEPHONE (OUTPATIENT)
Dept: CARDIOLOGY | Facility: CLINIC | Age: 68
End: 2018-10-23

## 2018-10-23 NOTE — TELEPHONE ENCOUNTER
Returned pt call states she wanted echo rescheduled to 11/6/ or 11/13. I have rescheduled pt appt to Tuesday 11/6/18 [facility] appt letter mailed to pt.

## 2018-10-23 NOTE — TELEPHONE ENCOUNTER
----- Message from Maren Cruz sent at 10/23/2018  3:38 PM CDT -----  Pt is calling to r/s echo if possible for 11/6 or 11/13. The same time would be fine. Please call pt back at [phone]. Pt ask that nurse leave a message if not avaible.       Thanks,   Maren Cruz

## 2018-11-06 ENCOUNTER — TELEPHONE (OUTPATIENT)
Dept: CARDIOLOGY | Facility: CLINIC | Age: 68
End: 2018-11-06

## 2018-11-06 NOTE — TELEPHONE ENCOUNTER
Returned call and r/s echo per pts request.  She verbalized understanding of date, time and location with no further questions at this time.

## 2018-11-06 NOTE — TELEPHONE ENCOUNTER
----- Message from Sherrie Kim RN sent at 11/6/2018  1:35 PM CST -----  Contact: Pt     ----- Message -----  From: Goldie Favorite  Sent: 11/6/2018  12:01 PM  To: [facility] Special Card Proc Clinical Support    Pt called and stated she needs to r/s because she had chemo and is tired. She can be reached at [phone].    Thanks,  Tf

## 2018-11-26 ENCOUNTER — CLINICAL SUPPORT (OUTPATIENT)
Dept: CARDIOLOGY | Facility: CLINIC | Age: 68
End: 2018-11-26
Attending: INTERNAL MEDICINE
Payer: MEDICARE

## 2018-11-26 DIAGNOSIS — I51.7 CARDIOMEGALY: ICD-10-CM

## 2018-11-26 DIAGNOSIS — C90.01 MULTIPLE MYELOMA IN REMISSION: ICD-10-CM

## 2018-11-26 LAB
ESTIMATED PA SYSTOLIC PRESSURE: 34.36
RETIRED EF AND QEF - SEE NOTES: 60 (ref 55–65)

## 2018-11-26 PROCEDURE — 93307 TTE W/O DOPPLER COMPLETE: CPT | Mod: PBBFAC,PO | Performed by: NUCLEAR MEDICINE

## 2018-11-27 ENCOUNTER — TELEPHONE (OUTPATIENT)
Dept: CARDIOLOGY | Facility: CLINIC | Age: 68
End: 2018-11-27

## 2018-11-27 NOTE — TELEPHONE ENCOUNTER
Spoke with patient to advise her that ECHO is stable.  Patient denies any questions/concerns.  Instructed to notify office should any questions/concerns arise.  Patient verbalized understanding.

## 2019-01-28 ENCOUNTER — LAB VISIT (OUTPATIENT)
Dept: LAB | Facility: HOSPITAL | Age: 69
End: 2019-01-28
Attending: FAMILY MEDICINE
Payer: MEDICARE

## 2019-01-28 ENCOUNTER — OFFICE VISIT (OUTPATIENT)
Dept: INTERNAL MEDICINE | Facility: CLINIC | Age: 69
End: 2019-01-28
Payer: MEDICARE

## 2019-01-28 VITALS
HEIGHT: 64 IN | BODY MASS INDEX: 29.7 KG/M2 | OXYGEN SATURATION: 96 % | DIASTOLIC BLOOD PRESSURE: 76 MMHG | SYSTOLIC BLOOD PRESSURE: 136 MMHG | TEMPERATURE: 98 F | HEART RATE: 104 BPM | WEIGHT: 173.94 LBS

## 2019-01-28 DIAGNOSIS — Z11.59 NEED FOR HEPATITIS C SCREENING TEST: ICD-10-CM

## 2019-01-28 DIAGNOSIS — R73.9 ELEVATED SERUM GLUCOSE: ICD-10-CM

## 2019-01-28 DIAGNOSIS — R73.9 ELEVATED SERUM GLUCOSE: Primary | ICD-10-CM

## 2019-01-28 PROBLEM — Z12.11 COLON CANCER SCREENING: Status: RESOLVED | Noted: 2018-07-27 | Resolved: 2019-01-28

## 2019-01-28 PROBLEM — Z12.11 SPECIAL SCREENING FOR MALIGNANT NEOPLASMS, COLON: Status: RESOLVED | Noted: 2018-07-27 | Resolved: 2019-01-28

## 2019-01-28 LAB
ESTIMATED AVG GLUCOSE: 148 MG/DL
HBA1C MFR BLD HPLC: 6.8 %

## 2019-01-28 PROCEDURE — 99214 PR OFFICE/OUTPT VISIT, EST, LEVL IV, 30-39 MIN: ICD-10-PCS | Mod: S$PBB,,, | Performed by: FAMILY MEDICINE

## 2019-01-28 PROCEDURE — 86803 HEPATITIS C AB TEST: CPT

## 2019-01-28 PROCEDURE — 99999 PR PBB SHADOW E&M-EST. PATIENT-LVL III: ICD-10-PCS | Mod: PBBFAC,,, | Performed by: FAMILY MEDICINE

## 2019-01-28 PROCEDURE — 99214 OFFICE O/P EST MOD 30 MIN: CPT | Mod: S$PBB,,, | Performed by: FAMILY MEDICINE

## 2019-01-28 PROCEDURE — 99213 OFFICE O/P EST LOW 20 MIN: CPT | Mod: PBBFAC,PN | Performed by: FAMILY MEDICINE

## 2019-01-28 PROCEDURE — 36415 COLL VENOUS BLD VENIPUNCTURE: CPT

## 2019-01-28 PROCEDURE — 99999 PR PBB SHADOW E&M-EST. PATIENT-LVL III: CPT | Mod: PBBFAC,,, | Performed by: FAMILY MEDICINE

## 2019-01-28 PROCEDURE — 83036 HEMOGLOBIN GLYCOSYLATED A1C: CPT

## 2019-01-28 RX ORDER — POMALIDOMIDE 3 MG/1
CAPSULE ORAL
Refills: 0 | COMMUNITY
Start: 2019-01-22

## 2019-01-28 NOTE — PROGRESS NOTES
"Subjective:   Patient ID: Rhonda Jaramillo is a 69 y.o. female.  Chief Complaint:  Follow-up      Patient presents for evaluation of elevated blood sugars while receiving chemotherapy/ high-dose steroids for multiple myeloma.    Records showed most recent readings as high as 350.  Previous readings 150-200.    No known prediabetes or diabetes.    No A1c on file.  Noted hepatitis-C screening on file.    Patient reports flu vaccine for this season with pneumonia vaccine updated through transplant specialist.    No specific complaints or concerns today.  Overall feels "very good"      Review of Systems   Constitutional: Negative for diaphoresis, fatigue and fever.   Eyes: Negative for visual disturbance.   Respiratory: Negative for shortness of breath.    Cardiovascular: Negative for chest pain and leg swelling.   Gastrointestinal: Negative for abdominal pain, constipation, diarrhea, nausea and vomiting.   Endocrine: Negative for polydipsia, polyphagia and polyuria.   Genitourinary: Negative for difficulty urinating, dysuria, flank pain, frequency, hematuria and urgency.   Musculoskeletal: Negative for myalgias.   Skin: Negative for rash.   Neurological: Negative for dizziness, weakness, light-headedness, numbness and headaches.     Objective:   /76 (BP Location: Right arm, Patient Position: Sitting, BP Method: Large (Manual))   Pulse 104   Temp 98 °F (36.7 °C) (Tympanic)   Ht 5' 4" (1.626 m)   Wt 78.9 kg (173 lb 15.1 oz)   SpO2 96%   BMI 29.86 kg/m²     Physical Exam   Constitutional: Vital signs are normal. She appears well-developed and well-nourished.   Eyes: No scleral icterus.   Neck: No JVD present.   Cardiovascular: Normal rate and regular rhythm. Exam reveals no gallop and no friction rub.   No murmur heard.  Pulmonary/Chest: Effort normal and breath sounds normal. She has no wheezes. She has no rhonchi. She has no rales.   Abdominal: Soft. She exhibits no distension. There is no tenderness. "   Neurological: She displays a negative Romberg sign. Coordination and gait normal.   Skin: No rash noted.   Nursing note and vitals reviewed.    Assessment:       ICD-10-CM ICD-9-CM   1. Elevated serum glucose R73.9 790.29   2. Need for hepatitis C screening test Z11.59 V73.89     Plan:   Elevated serum glucose  -     Hemoglobin A1c; Future; Expected date: 01/28/2019  Check A1c.    Treat for prediabetes diabetes indicated.    If normal, steroid induced/related hyperglycemia but no maintenance treatment needed.      Need for hepatitis C screening test  -     Hepatitis C antibody; Future; Expected date: 01/28/2019    Follow up with all specialists as scheduled.    Return to clinic as needed.

## 2019-01-29 ENCOUNTER — TELEPHONE (OUTPATIENT)
Dept: INTERNAL MEDICINE | Facility: CLINIC | Age: 69
End: 2019-01-29

## 2019-01-29 DIAGNOSIS — R73.03 PREDIABETES: Primary | ICD-10-CM

## 2019-01-29 LAB — HCV AB SERPL QL IA: NEGATIVE

## 2019-01-29 RX ORDER — METFORMIN HYDROCHLORIDE 500 MG/1
500 TABLET ORAL
Qty: 90 TABLET | Refills: 1 | Status: SHIPPED | OUTPATIENT
Start: 2019-01-29 | End: 2020-01-29

## 2019-01-29 NOTE — TELEPHONE ENCOUNTER
----- Message from Raymond Mireles MD sent at 1/29/2019  8:29 AM CST -----  A1c in prediabetic /early diabetic range.    Average sugar is elevated and not just related to steroid use during chemo treatments.    Should start metformin 500 mg daily    Should have a Follow-up visit in 3 months.    If she prefers to talk about the medication 1st, please schedule an appointment.

## 2019-02-13 ENCOUNTER — TELEPHONE (OUTPATIENT)
Dept: INTERNAL MEDICINE | Facility: CLINIC | Age: 69
End: 2019-02-13

## 2019-02-13 NOTE — TELEPHONE ENCOUNTER
Patient states that her family told her that Metformin is dangerous and she wants to know if you would prescribe a different type of diabetes medication.  Also patient wants to know if you would send in a script for glucose machine.

## 2019-02-13 NOTE — TELEPHONE ENCOUNTER
----- Message from Shae Chávez sent at 2/13/2019 11:55 AM CST -----  Contact: pt  Pt stated she has a question about her diabetes medication wants to know her of diabetes, she can be reached at [phone]      Medication metformin 500 mg tab, 1 a day

## 2019-02-13 NOTE — TELEPHONE ENCOUNTER
She only has prediabetes.    The insurance will not cover anything else but metformin.    The insurance will not cover  a glucose machine

## 2019-02-25 ENCOUNTER — TELEPHONE (OUTPATIENT)
Dept: INTERNAL MEDICINE | Facility: CLINIC | Age: 69
End: 2019-02-25

## 2019-02-25 NOTE — TELEPHONE ENCOUNTER
----- Message from Lisbeth Cunningham sent at 2/25/2019  3:32 PM CST -----  Contact: Patient  Type:  Needs Medical Advice    Who Called: Rhonda  Symptoms (please be specific): n/a  How long has patient had these symptoms:  n/a  Pharmacy name and phone #:  n/a  Would the patient rather a call back or a response via [facility]?  Call back  Best Call Back Number: [phone]  Additional Information: Patient called to speak with the nurse; she would like a referral to a dermatologist. She keeps breaking out on her face.    Thanks,  Lisbeth

## 2019-02-25 NOTE — TELEPHONE ENCOUNTER
Notified patient I would forward her request to Dr. Mireles for review. She asked someone return her call regarding a derm referral or derm appointment on Wednesday as she has chemo tomorrow. Informed her I would let Dr. Mireles know this. Patient thanked me and ended call.

## 2019-02-26 ENCOUNTER — TELEPHONE (OUTPATIENT)
Dept: INTERNAL MEDICINE | Facility: CLINIC | Age: 69
End: 2019-02-26

## 2019-02-26 NOTE — TELEPHONE ENCOUNTER
----- Message from Gina Loza sent at 2/26/2019  4:44 PM CST -----  Contact: self  Type:  Patient Returning Call    Who Called:pt  Who Left Message for Patient:[email or web address]  Does the patient know what this is regarding?:no  Would the patient rather a call back or a response via [facility]? Call back  Best Call Back Number:[phone]  Additional Information: none    Thanks,  Gina Loza

## 2019-02-26 NOTE — TELEPHONE ENCOUNTER
Seen 1/28/2018.  No mention of rash or lesion in note.    Need to know why she is requesting to see Dermatology in order to place a referral.    Not likely will be able to get her and that quickly however.

## 2019-02-27 DIAGNOSIS — R21 FACIAL RASH: Primary | ICD-10-CM

## 2019-03-07 ENCOUNTER — LAB VISIT (OUTPATIENT)
Dept: LAB | Facility: HOSPITAL | Age: 69
End: 2019-03-07
Payer: MEDICARE

## 2019-03-07 ENCOUNTER — OFFICE VISIT (OUTPATIENT)
Dept: DERMATOLOGY | Facility: CLINIC | Age: 69
End: 2019-03-07
Payer: MEDICARE

## 2019-03-07 DIAGNOSIS — L30.9 DERMATITIS: ICD-10-CM

## 2019-03-07 DIAGNOSIS — L30.9 DERMATITIS: Primary | ICD-10-CM

## 2019-03-07 LAB
BASOPHILS # BLD AUTO: 0.02 K/UL
BASOPHILS NFR BLD: 0.5 %
DIFFERENTIAL METHOD: ABNORMAL
EOSINOPHIL # BLD AUTO: 0 K/UL
EOSINOPHIL NFR BLD: 0 %
ERYTHROCYTE [DISTWIDTH] IN BLOOD BY AUTOMATED COUNT: 14.4 %
HCT VFR BLD AUTO: 39.9 %
HGB BLD-MCNC: 12.7 G/DL
IMM GRANULOCYTES # BLD AUTO: 0.01 K/UL
IMM GRANULOCYTES NFR BLD AUTO: 0.3 %
LYMPHOCYTES # BLD AUTO: 1.4 K/UL
LYMPHOCYTES NFR BLD: 36.1 %
MCH RBC QN AUTO: 30.2 PG
MCHC RBC AUTO-ENTMCNC: 31.8 G/DL
MCV RBC AUTO: 95 FL
MONOCYTES # BLD AUTO: 0.6 K/UL
MONOCYTES NFR BLD: 17 %
NEUTROPHILS # BLD AUTO: 1.7 K/UL
NEUTROPHILS NFR BLD: 46.1 %
NRBC BLD-RTO: 0 /100 WBC
PLATELET # BLD AUTO: 337 K/UL
PMV BLD AUTO: 9.4 FL
RBC # BLD AUTO: 4.2 M/UL
WBC # BLD AUTO: 3.77 K/UL

## 2019-03-07 PROCEDURE — 85025 COMPLETE CBC W/AUTO DIFF WBC: CPT

## 2019-03-07 PROCEDURE — 99213 OFFICE O/P EST LOW 20 MIN: CPT | Mod: PBBFAC,PN | Performed by: PHYSICIAN ASSISTANT

## 2019-03-07 PROCEDURE — 99202 PR OFFICE/OUTPT VISIT, NEW, LEVL II, 15-29 MIN: ICD-10-PCS | Mod: S$PBB,,, | Performed by: PHYSICIAN ASSISTANT

## 2019-03-07 PROCEDURE — 99202 OFFICE O/P NEW SF 15 MIN: CPT | Mod: S$PBB,,, | Performed by: PHYSICIAN ASSISTANT

## 2019-03-07 PROCEDURE — 99999 PR PBB SHADOW E&M-EST. PATIENT-LVL III: ICD-10-PCS | Mod: PBBFAC,,, | Performed by: PHYSICIAN ASSISTANT

## 2019-03-07 PROCEDURE — 36415 COLL VENOUS BLD VENIPUNCTURE: CPT

## 2019-03-07 PROCEDURE — 86038 ANTINUCLEAR ANTIBODIES: CPT

## 2019-03-07 PROCEDURE — 99999 PR PBB SHADOW E&M-EST. PATIENT-LVL III: CPT | Mod: PBBFAC,,, | Performed by: PHYSICIAN ASSISTANT

## 2019-03-07 RX ORDER — POTASSIUM CHLORIDE 20 MEQ/1
TABLET, EXTENDED RELEASE ORAL
Refills: 0 | COMMUNITY
Start: 2019-02-21

## 2019-03-07 RX ORDER — TRIAMCINOLONE ACETONIDE 0.25 MG/G
CREAM TOPICAL 2 TIMES DAILY
Qty: 30 G | Refills: 0 | Status: SHIPPED | OUTPATIENT
Start: 2019-03-07

## 2019-03-07 NOTE — PROGRESS NOTES
Subjective:       Patient ID:  Rhonda Jaramillo is a 69 y.o. female who presents for   Chief Complaint   Patient presents with    Rash     on face around mouth     History of Present Illness: The patient presents with chief complaint of rash. Denies any recent sun exposure or new cosmetics.  Location: face  (central face)  Duration: 2 weeks  Signs/Symptoms: red    Prior treatments: CeraVe foaming cleanser qd and Cera Ve moisturizer qd-bid without any improvement    PMHX: multiple myeloma (undergoing chemotherapy, s/p stem cell transplant, followed by Dr. Ayala at [facility]); Denies history of lupus; History of allergy to blue dyes        Review of Systems   Constitutional: Negative for fever, chills and fatigue.   Gastrointestinal: Negative for nausea and vomiting.   Skin: Positive for itching, rash and dry skin. Negative for sun sensitivity, daily sunscreen use, activity-related sunscreen use and recent sunburn.   Hematologic/Lymphatic: Does not bruise/bleed easily.        Objective:    Physical Exam   Constitutional: She appears well-developed and well-nourished. No distress.   Neurological: She is alert and oriented to person, place, and time. She is not disoriented.   Psychiatric: She has a normal mood and affect.   Skin:   Areas Examined (abnormalities noted in diagram):   Head / Face Inspection Performed  Neck Inspection Performed  Chest / Axilla Inspection Performed  Back Inspection Performed  RUE Inspected  LUE Inspection Performed              Diagram Legend     Erythematous scaling macule/papule c/w actinic keratosis       Vascular papule c/w angioma      Pigmented verrucoid papule/plaque c/w seborrheic keratosis      Yellow umbilicated papule c/w sebaceous hyperplasia      Irregularly shaped tan macule c/w lentigo     1-2 mm smooth white papules consistent with Milia      Movable subcutaneous cyst with punctum c/w epidermal inclusion cyst      Subcutaneous movable cyst c/w pilar cyst      Firm pink to  brown papule c/w dermatofibroma      Pedunculated fleshy papule(s) c/w skin tag(s)      Evenly pigmented macule c/w junctional nevus     Mildly variegated pigmented, slightly irregular-bordered macule c/w mildly atypical nevus      Flesh colored to evenly pigmented papule c/w intradermal nevus       Pink pearly papule/plaque c/w basal cell carcinoma      Erythematous hyperkeratotic cursted plaque c/w SCC      Surgical scar with no sign of skin cancer recurrence      Open and closed comedones      Inflammatory papules and pustules      Verrucoid papule consistent consistent with wart     Erythematous eczematous patches and plaques     Dystrophic onycholytic nail with subungual debris c/w onychomycosis     Umbilicated papule    Erythematous-base heme-crusted tan verrucoid plaque consistent with inflamed seborrheic keratosis     Erythematous Silvery Scaling Plaque c/w Psoriasis     See annotation      Assessment / Plan:        Dermatitis  -     triamcinolone acetonide 0.025% (KENALOG) 0.025 % cream; Apply topically 2 (two) times daily.  Dispense: 30 g; Refill: 0  -     JACK Screen w/Reflex; Future; Expected date: 03/07/2019  -     CBC auto differential; Future; Expected date: 03/07/2019  Ddx: seb derm vs. Rosacea vs. ACD vs. Other inflammatory  Will check JACK to r/o potential lupus. Will start above med. Advised to d/c Cera Ve Foaming cleanser as I believe this may be too drying for her skin at this time. Trial of Cetaphil Gentle Cleanser recommended. May continue either Cetaphil or Cera Ve moisturizer (hypoallergenic).             Follow-up in about 4 weeks (around 4/4/2019) for dermatitis.

## 2019-03-07 NOTE — PATIENT INSTRUCTIONS
Stop Cera Ve Foaming Cleanser for now.  Start either Cetaphil Gentle Cleanser and a hypoallergenic mositurizer such as Cetaphil for face.

## 2019-03-07 NOTE — LETTER
March 7, 2019      Raymond Mireles MD  [address] [facility] [address]           [facility]  [address]  [place] [address]  Phone: [phone]  Fax: [phone]          Patient: Rhonda Jaramillo   MR Number: [id number]   YOB: 1950   Date of Visit: 3/7/2019       Dear Dr. Raymond Mireles:    Thank you for referring Rhonda Jaramillo to me for evaluation. Attached you will find relevant portions of my assessment and plan of care.    If you have questions, please do not hesitate to call me. I look forward to following Rhonda Jaramillo along with you.    Sincerely,    Es Veras PA-C    Enclosure  CC:  No Recipients    If you would like to receive this communication electronically, please contact [email or web address] or [phone] to request more information on [employer] access.    For providers and/or their staff who would like to refer a patient to [facility], please contact us through our one-stop-shop provider referral line, [facility] at [phone].    If you feel you have received this communication in error or would no longer like to receive these types of communications, please e-mail [email or web address]

## 2019-03-08 LAB — ANA SER QL IF: NORMAL

## 2019-03-12 ENCOUNTER — TELEPHONE (OUTPATIENT)
Dept: DERMATOLOGY | Facility: CLINIC | Age: 69
End: 2019-03-12

## 2019-03-12 NOTE — TELEPHONE ENCOUNTER
----- Message from Lynn Burdick sent at 3/12/2019  3:21 PM CDT -----  Contact: self [phone]  Type:  Patient Returning Call    Who Called:Rhonda Jaramillo  Who Left Message for Patient:unk  Does the patient know what this is regarding?:test results  Would the patient rather a call back or a response via [facility]? Call back   Best Call Back Number:[phone]  Additional Information:

## 2019-03-12 NOTE — TELEPHONE ENCOUNTER
Returned call to pt and reviewed lab results and Es's recommendations.  Pt verbalized understanding to all information given and had no further questions.

## 2019-03-25 ENCOUNTER — TELEPHONE (OUTPATIENT)
Dept: INTERNAL MEDICINE | Facility: CLINIC | Age: 69
End: 2019-03-25

## 2019-03-25 DIAGNOSIS — Z12.31 ENCOUNTER FOR SCREENING MAMMOGRAM FOR BREAST CANCER: Primary | ICD-10-CM

## 2019-03-25 NOTE — TELEPHONE ENCOUNTER
----- Message from Lisbeth Cunningham sent at 3/25/2019  3:10 PM CDT -----  Contact: Patient    Who Called:  Rhonda  Symptoms (please be specific): n/a  How long has patient had these symptoms:  n/a  Pharmacy name and phone #:  n/a  Would the patient rather a call back or a response via [facility]?  Call back   Best Call Back Number: [phone]  Additional Information: the patient called to schedule her Mammo.

## 2019-04-08 ENCOUNTER — HOSPITAL ENCOUNTER (OUTPATIENT)
Dept: RADIOLOGY | Facility: HOSPITAL | Age: 69
Discharge: HOME | End: 2019-04-08
Attending: FAMILY MEDICINE
Payer: MEDICARE

## 2019-04-08 VITALS — HEIGHT: 65 IN | WEIGHT: 173 LBS | BODY MASS INDEX: 28.82 KG/M2

## 2019-04-08 DIAGNOSIS — Z12.31 ENCOUNTER FOR SCREENING MAMMOGRAM FOR BREAST CANCER: ICD-10-CM

## 2019-04-08 PROCEDURE — 77067 SCR MAMMO BI INCL CAD: CPT | Mod: TC

## 2019-04-08 PROCEDURE — 77063 MAMMO DIGITAL SCREENING BILAT WITH TOMOSYNTHESIS_CAD: ICD-10-PCS | Mod: 26,,, | Performed by: RADIOLOGY

## 2019-04-08 PROCEDURE — 77067 SCR MAMMO BI INCL CAD: CPT | Mod: 26,,, | Performed by: RADIOLOGY

## 2019-04-08 PROCEDURE — 77063 BREAST TOMOSYNTHESIS BI: CPT | Mod: 26,,, | Performed by: RADIOLOGY

## 2019-04-08 PROCEDURE — 77067 MAMMO DIGITAL SCREENING BILAT WITH TOMOSYNTHESIS_CAD: ICD-10-PCS | Mod: 26,,, | Performed by: RADIOLOGY

## 2019-04-15 ENCOUNTER — OFFICE VISIT (OUTPATIENT)
Dept: DERMATOLOGY | Facility: CLINIC | Age: 69
End: 2019-04-15
Payer: MEDICARE

## 2019-04-15 DIAGNOSIS — L30.9 DERMATITIS: Primary | ICD-10-CM

## 2019-04-15 PROCEDURE — 99999 PR PBB SHADOW E&M-EST. PATIENT-LVL II: CPT | Mod: PBBFAC,,, | Performed by: PHYSICIAN ASSISTANT

## 2019-04-15 PROCEDURE — 99999 PR PBB SHADOW E&M-EST. PATIENT-LVL II: ICD-10-PCS | Mod: PBBFAC,,, | Performed by: PHYSICIAN ASSISTANT

## 2019-04-15 PROCEDURE — 99212 OFFICE O/P EST SF 10 MIN: CPT | Mod: PBBFAC,PN | Performed by: PHYSICIAN ASSISTANT

## 2019-04-15 PROCEDURE — 99213 PR OFFICE/OUTPT VISIT, EST, LEVL III, 20-29 MIN: ICD-10-PCS | Mod: S$PBB,,, | Performed by: PHYSICIAN ASSISTANT

## 2019-04-15 PROCEDURE — 99213 OFFICE O/P EST LOW 20 MIN: CPT | Mod: S$PBB,,, | Performed by: PHYSICIAN ASSISTANT

## 2019-04-15 RX ORDER — METRONIDAZOLE 7.5 MG/G
CREAM TOPICAL
Qty: 45 G | Refills: 2 | Status: SHIPPED | OUTPATIENT
Start: 2019-04-15

## 2019-04-15 NOTE — PATIENT INSTRUCTIONS
Start broad spectrum sunscreen with minimum SPF 30.  Use sunscreen daily.  Look for one for sensitive skin and for the face. Ex. Cetaphil Redness Control, Cetaphil for face, Cera Ve Ultra Light AM moisturizing sunscreen, or Aveeno.

## 2019-04-15 NOTE — PROGRESS NOTES
Subjective:       Patient ID:  Rhonda Jaramillo is a 69 y.o. female who presents for   Chief Complaint   Patient presents with    Follow-up     pt states improvement     Hx of facial dermatitis, last seen 3/7/19, negative JACK (3/7/19), current tx: TAC 0.025% cream bid and Cetaphil Gentle Cleanser twice daily.  + Overall improvement, but flared over weekend after wearing [employer] sunscreen for face.      PMHX: multiple myeloma (undergoing chemo, s/p stem cell transplant, followed by Dr. Ayala at [facility]). +History of allergy to blue dyes.      Review of Systems   Constitutional: Negative for fever and chills.   Gastrointestinal: Negative for nausea and vomiting.   Skin: Positive for rash, dry skin, daily sunscreen use and activity-related sunscreen use. Negative for itching, sun sensitivity, recent sunburn and dry lips.   Hematologic/Lymphatic: Does not bruise/bleed easily.        Objective:    Physical Exam   Constitutional: She appears well-developed and well-nourished. No distress.   Neurological: She is alert and oriented to person, place, and time. She is not disoriented.   Psychiatric: She has a normal mood and affect.   Skin:   Areas Examined (abnormalities noted in diagram):   Head / Face Inspection Performed  Neck Inspection Performed  Chest / Axilla Inspection Performed  Back Inspection Performed  RUE Inspected  LUE Inspection Performed              Diagram Legend     Erythematous scaling macule/papule c/w actinic keratosis       Vascular papule c/w angioma      Pigmented verrucoid papule/plaque c/w seborrheic keratosis      Yellow umbilicated papule c/w sebaceous hyperplasia      Irregularly shaped tan macule c/w lentigo     1-2 mm smooth white papules consistent with Milia      Movable subcutaneous cyst with punctum c/w epidermal inclusion cyst      Subcutaneous movable cyst c/w pilar cyst      Firm pink to brown papule c/w dermatofibroma      Pedunculated fleshy papule(s) c/w skin tag(s)       Evenly pigmented macule c/w junctional nevus     Mildly variegated pigmented, slightly irregular-bordered macule c/w mildly atypical nevus      Flesh colored to evenly pigmented papule c/w intradermal nevus       Pink pearly papule/plaque c/w basal cell carcinoma      Erythematous hyperkeratotic cursted plaque c/w SCC      Surgical scar with no sign of skin cancer recurrence      Open and closed comedones      Inflammatory papules and pustules      Verrucoid papule consistent consistent with wart     Erythematous eczematous patches and plaques     Dystrophic onycholytic nail with subungual debris c/w onychomycosis     Umbilicated papule    Erythematous-base heme-crusted tan verrucoid plaque consistent with inflamed seborrheic keratosis     Erythematous Silvery Scaling Plaque c/w Psoriasis     See annotation      Assessment / Plan:        Dermatitis  -     metronidazole 0.75% (METROCREAM) 0.75 % Crea; AAA of face 1-2 times as tolerated for rosacea.  Dispense: 45 g; Refill: 2  Ddx: rosacea vs. ACD vs. Other inflammatory  D/c TAC, will start above med + daily sunscreen w/spf 30. Continue Cetaphil gentle cleanser and gentle skin care.  Reviewed labs from 3/7/19 (negative JACK).  Consider oral doxycycline in the future.         Follow up in about 6 weeks (around 5/27/2019) for rosacea.

## 2019-05-28 ENCOUNTER — PATIENT OUTREACH (OUTPATIENT)
Dept: ADMINISTRATIVE | Facility: HOSPITAL | Age: 69
End: 2019-05-28

## 2019-05-28 NOTE — LETTER
May 28, 2019        Rhonda Jaramillo  [address]  [place] [address]      Dear Ms. Jaramillo,    You have an upcoming appointment with Raymond Mireles MD on 06/11/19.      Your chart is indicating you may be due for the following and I will be happy to assist you in scheduling any needed appointments:  Health Maintenance Due   Topic    DEXA SCAN     Pneumococcal Vaccine (65+ High/Highest Risk) (2 of 2 - PPSV23)          If you have had any of the above done at another facility, please bring the records or information with you so that your record at [facility] will be complete.    We will be happy to assist you with scheduling any necessary appointments or you may contact the [facility] appointment desk at [phone] to schedule at your convenience.     Thank you for choosing [facility] for your healthcare needs,      Kayli CUEVA LPN Care Coordinator  [facility]  [phone]